# Patient Record
Sex: FEMALE | Race: BLACK OR AFRICAN AMERICAN | Employment: UNEMPLOYED | URBAN - METROPOLITAN AREA
[De-identification: names, ages, dates, MRNs, and addresses within clinical notes are randomized per-mention and may not be internally consistent; named-entity substitution may affect disease eponyms.]

---

## 2020-08-31 ENCOUNTER — VIRTUAL VISIT (OUTPATIENT)
Dept: SURGERY | Age: 40
End: 2020-08-31

## 2020-08-31 VITALS — HEIGHT: 64 IN | WEIGHT: 293 LBS | BODY MASS INDEX: 50.02 KG/M2

## 2020-08-31 DIAGNOSIS — K21.9 GASTROESOPHAGEAL REFLUX DISEASE WITHOUT ESOPHAGITIS: Primary | ICD-10-CM

## 2020-08-31 DIAGNOSIS — E66.01 MORBID OBESITY (HCC): ICD-10-CM

## 2020-08-31 DIAGNOSIS — D64.9 ANEMIA, UNSPECIFIED TYPE: ICD-10-CM

## 2020-08-31 DIAGNOSIS — M19.90 ARTHRITIS: ICD-10-CM

## 2020-08-31 DIAGNOSIS — E66.01 MORBID OBESITY WITH BMI OF 60.0-69.9, ADULT (HCC): ICD-10-CM

## 2020-08-31 DIAGNOSIS — K21.9 GASTROESOPHAGEAL REFLUX DISEASE, ESOPHAGITIS PRESENCE NOT SPECIFIED: ICD-10-CM

## 2020-08-31 RX ORDER — OMEPRAZOLE 40 MG/1
CAPSULE, DELAYED RELEASE ORAL
COMMUNITY
Start: 2020-08-19

## 2020-08-31 RX ORDER — ARIPIPRAZOLE 5 MG/1
TABLET ORAL
COMMUNITY
Start: 2020-08-28

## 2020-08-31 RX ORDER — CHOLECALCIFEROL (VITAMIN D3) 50 MCG
CAPSULE ORAL
COMMUNITY
End: 2020-12-21 | Stop reason: ALTCHOICE

## 2020-08-31 RX ORDER — FLUOXETINE HYDROCHLORIDE 40 MG/1
CAPSULE ORAL
COMMUNITY
Start: 2020-08-15

## 2020-08-31 RX ORDER — TRIAMTERENE/HYDROCHLOROTHIAZID 37.5-25 MG
TABLET ORAL AS NEEDED
COMMUNITY
Start: 2020-08-25

## 2020-08-31 RX ORDER — PHENTERMINE HYDROCHLORIDE 37.5 MG/1
TABLET ORAL
COMMUNITY
Start: 2020-06-26 | End: 2020-12-21 | Stop reason: ALTCHOICE

## 2020-08-31 NOTE — PROGRESS NOTES
Revision Surgery Consultation    Subjective: The patient is a 42 y.o. obese female with a Body mass index is 64.03 kg/m². .  The patient had a Lap band procedure done approximatly 12 years ago in [place] by Dr. Ny Cárdenas.  her starting weight prior to surgery was 320. she ultimately lost approximately 140 lbs with a subsequent weight regain of all of her weight.  her last bariatric follow-up was many years ago with Dr Ny Cárdenas after he removed her band due to chronic dysphagia. Vivien Luna notes that she had no issues in the immediate post-op phase and had no hospital readmissions in the remote post-op phase. she currently is having the following issues related to his health:weight regain. she is here today to discuss a possible conversion because of weight regain. All of their prior evaluations available by both their PCP's and specialists physicians have been reviewed today either in the Care Everywhere portal or scanned under the media tab. I have spent a large portion of my initial consultation today reviewing the patients current dietary habits which have contributed to their health issues, weight regain and  their current obesity. They understand that generally speaking,  weight regain is  a function of resuming less that ideal dietary habits instead of being a procedural issue. They understand that older procedures are more likely to be associated with a less that perfect procedural result, such as a prior vertical banded gastroplasty or non divided gastric bypass. These procedures are more likely to result in staple line failures with resultant weight regain. This has been explained to the patient via diagrams of these older procedures and given to the patient. I have suggested to them personally a dietary regimen that they can initiate now to help with their status as it pertains to their weight.   They understand that the most important aspect of their journey through their weight loss endeavor will be their adherence to a new lifestyle of healthy eating behavior. They also understand that an adherence to an exercise program will not only help with weight loss but is ultimately important in weight maintenance. Patient Active Problem List    Diagnosis Date Noted    Morbid obesity ([facility] [address]     Morbid obesity with BMI of 60.0-69.9, adult ([facility] [address]     Acid reflux       Past Surgical History:   Procedure Laterality Date    HX CHOLECYSTECTOMY      HX GYN      lap band placed and removed 2008 and 2012    HX GYN      D & C    HX ORTHOPAEDIC      right knee surgery    HX OTHER SURGICAL        Social History     Tobacco Use    Smoking status: Never Smoker    Smokeless tobacco: Never Used   Substance Use Topics    Alcohol use: Yes      History reviewed. No pertinent family history. Current Outpatient Medications   Medication Sig Dispense Refill    ARIPiprazole (ABILIFY) 5 mg tablet TK 1 T PO QD      FLUoxetine (PROzac) 40 mg capsule TK 2 CS PO Q DAY      TURMERIC PO Take  by mouth.  omega 3-dha-epa-fish oil (Fish Oil) 100-160-1,000 mg cap Take  by mouth.  triamterene-hydroCHLOROthiazide (MAXZIDE) 37.5-25 mg per tablet TK 1 T PO QD      omeprazole (PRILOSEC) 40 mg capsule TK ONE C PO QD BEFORE A MEAL      ergocalciferol (VITAMIN D2) 50,000 unit capsule Take 50,000 Units by mouth.  ferrous sulfate (IRON) 325 mg (65 mg iron) tablet Take 325 mg by mouth Daily (before breakfast).  CIMETIDINE (TAGAMET PO) Take 200 mg by mouth.  famotidine (PEPCID) 20 mg tablet Take 20 mg by mouth two (2) times a day.  phentermine (ADIPEX-P) 37.5 mg tablet TAKE 1 TABLET BY MOUTH ONCE DAILY BEFORE BREAKFAST      ondansetron (ZOFRAN ODT) 4 mg disintegrating tablet Take 1 Tab by mouth every eight (8) hours as needed for Nausea.  12 Tab 0     No Known Allergies       Review of Systems:            General - No history or complaints of unexpected fever, chills, or weight loss  Head/Neck - No history or complaints of headache, diplopia, dysphagia, hearing loss  Cardiac - No history or complaints of chest pain, palpitations, murmur, or shortness of breath  Pulmonary - No history or complaints of shortness of breath, productive cough, hemoptysis  Gastrointestinal - No history or complaints of reflux,  abdominal pain, obstipation/constipation, blood per rectum  Genitourinary - No history or complaints of hematuria/dysuria, stress urinary incontinence symptoms, or renal lithiasis  Musculoskeletal - No history or complaints of joint pain or muscular weakness  Hematologic - No history or complaints of bleeding disorders, blood transfusions, sickle cell anemia  Neurologic - No history or complaints of  migraine headaches, seizure activity, syncopal episodes, TIA or stroke  Integumentary - No history or complaints of rashes, abnormal nevi, skin cancer  Gynecological - normal mense           Objective:     Visit [facility] 5' 4\" (1.626 m)   Wt (!) 169.2 kg (373 lb)   BMI 64.03 kg/m²       Physical Examination:      Physical Examination: General appearance - alert, well appearing, and in no distress and oriented to person, place, and time  Mental status - alert, oriented to person, place, and time, normal mood, behavior, speech, dress, motor activity, and thought processes  Eyes - pupils equal and reactive, extraocular eye movements intact, sclera anicteric, left eye normal, right eye normal  Ears - right ear normal, left ear normal  Neck- good extension and flexion, no obvious swelling  Chest - good air movement  Heart - N/A  Abdomen - no obvious distension. Neurological - alert, oriented, normal speech, no focal findings or movement disorder noted  Musculoskeletal - no swelling noted  Extremities - normal movement    Labs / Old Records:              Old operative reports reviewed if available and are scanned under the media tab or reviewed under Care Bridget Barajas MD - 12/17/2012 3:43 PM EST  OPERATION    LOCATION OF PATIENT:  Room: [facility]    DATE OF OPERATION:  12/17/2012    SURGEON:  Ingrid Wang MD    PREOPERATIVE DIAGNOSIS:  1. Worsening reflux with regurgitation and dysphagia. 2. Status post placement of adjustable gastric band in the remote past.    POSTOPERATIVE DIAGNOSIS:  1. Worsening reflux with regurgitation and dysphagia. 2. Status post placement of adjustable gastric band in the remote past.    PROCEDURE:  Laparoscopy with removal of the adjustable gastric band (both the band and the  subcutaneous port). ANESTHETIC:  General.    MEDICATION:  Patient received 2 g Ancef IV piggyback preoperatively. ESTIMATED BLOOD LOSS:  Minimal.    TRANSFUSION:  None. SPECIMENS:  None. DISPOSITION:  The patient was returned to the recovery room in stable condition. INDICATIONS:  This is a 42-year-old black female approximately 5 years status post placement  of a laparoscopic adjustable gastric banding. She has recently had some  increasing gastroesophageal reflux disease and regurgitation. She underwent  upper endoscopy over the summer which revealed no obvious upper abnormality  outside of some reflux, although she did have peptic ulcer disease in the  duodenum. She has had all the fluid removed from her band but she continues to  has reflux-type symptoms with dysphagia and she is now only tolerating small  amounts of liquids. Any solids cause her discomfort and regurgitation. She is  taken to the operating suite at this time for removal of the adjustable gastric  band. Of note, she has lost approximately 100 pounds since her original weight  loss surgery. DESCRIPTION OF OPERATION:  The patient was taken to the operating suite and placed in the supine position  and given general anesthetic. Her entire abdomen was then sterilely prepped  and draped.  Each of the incision sites was infiltrated with 0.5% Marcaine with  epinephrine. The port site was palpated and a previous skin incision was used. This was opened using sharp dissection and subsequently electrocautery  dissection. The port was loosened from the anterior abdominal wall and the  port was lifted up into the surgical field. The adhesions were taken down. The port was removed by cutting the tubing. Attempted to first use an Optiview  scope initially without the Veress needle, but there was a small amount of scar  tissue at the previous incision site. A Veress needle was used. We were able  to obtain pneumoperitoneum quite easily then. The Optiview scope was then used  to place the trocar under direct visualization. The remaining trocars were  placed under direct visualization as well, a 5 mm trocar in the right lateral  abdomen, a 15 mm trocar through the previous port incision site and a 5 mm  trocar in the left lateral abdomen. An Jenny retractor was used to retract the  liver up to expose the previous surgical area near the GE junction. There was  significant scarring towards the undersurface of the liver and around the band  itself. We were able to follow the tubing more proximally to the locking  mechanism. The locking mechanism was incised allowing us to unlock the band  itself. The thicker end of the locking mechanism was  sharply with  scissors dissection from the remainder of the band. This allowed the band to  easily pull through the scarred-in tunnel. The band was then completely  removed from the abdomen. On visualizing the area again, the area appeared  quite normal with scarring surrounding this area which was quite thick. This  was left in situ. This should slowly soften with time. There was no evidence  of any erosion. The band itself was quite clean. The fascial defect at the 15  mm trocar site was then closed with a single suture of 0 PDS. The remaining 5  mm trocars were removed under direct visualization. Hemostasis of the  abdominal wall trocar sites was noted to be quite good. The subcutaneous  tissue was irrigated with normal saline. Pneumoperitoneum was completely  released and the skin was closed with interrupted subcuticular sutures of 4-0  Monocryl. Sterile dressings were applied over the incision sites. The patient  was awakened and taken to the recovery room in stable condition. Sponge,  needle, and instrument counts were correct x2 at the end of the procedure.    ______________________________  SIGNED: Jm Epps MD    [facility] VN: [id number]  D: 12/17/2012 12:12 T: 12/17/2012 15:30 DOCUMENT: [id number]      Assessment:     Morbid obesity status post Lap band with subsequent removal procedure approximately 12 and 8  years ago with complaint of weight regain. At this juncture I have spent over 20 minutes discussing with the patient the current national and worldwide recommendations regarding LAP-BAND patients. She understands that the general recommended conversion procedure would be the gastric bypass and not the sleeve gastrectomy. The patient is very hesitant to pursue the gastric bypass procedure as she is very fearful of the operation. It sounds to me that likely the only procedure she would choose as a conversion procedure would be the sleeve gastrectomy. The patient understands that generally speaking this is considered an inferior surgery to the gastric bypass procedure. She will come in for an upper GI study to assess her prior surgical procedures and she will start seeing our nutritionist for her medical weight loss plan. Plan: 1. Weight regain-Today in our office I had a lengthy discussion with Mimi Deluna regarding the nature of their prior procedure. We discussed the anatomical changes to their anatomy and how this relates to  contributing weight regain.  Our office will continue to attempt to obtain any medical records, if not obtained or available already ,related to their procedure. It was also discussed today that before any decisions can be made regarding a possible revision of their initial  procedure that an upper GI swallow study must be obtained to evaluate their post surgical anatomy. We will proceed with the UGI swallow study as described above. The patient understands all of the above and wishes to proceed with the study. 2.Nutrition-  I have discussed in detail the pitfalls in diet that have contributed to their weight regain and the importance of adhering to a lifelong regimen of dietary goals and proper eating habits. I have discussed the proper lifelong bariatric diet  in detail spending in excess of 20 minutes discussing this. We will schedule them for a dietary consultation with our nutritionist and urge them to continue on a regular follow-up schedule with her. 3.Maintenance vitamins- Today we have discussed the importance of vitamins as it pertains to their procedure and we will obtain appropriate lab to check all levels. They have been provided a handout regarding this today. Total time spent with the patient reviewing their complex history of bariatric surgery,diet, and plan is in excess of 60 minutes. Secondary Diagnoses:         Signed By: Kimo Wright MD     August 31, 2020         This visit with Renetta Bueno was performed under virtual telemedicine guidelines during the coronavirus (KKHAJ-23) public health emergency on 8/31/2020 in an interactive   fashion using [employer] me. They understand that this telemedicine encounter is a billable service, with coverage determined by their insurance carrier. They are aware that   they may receive a bill and have provided verbal consent for this virtual visit. This visit was performed with the patient in their home environment and provider was   present at [facility].  I have spent over 60 minutes on this visit  both prior to the visits reviewing the patients chart and with the patient face to face. I have reviewed their medical history, performed a telemedicine physical examination, and discussed the plan of action to date. They understand that they will be asked   to come to the office when our office is allowed normal patient interaction, as dictated by public health officials, for a face-to-face visit to rediscuss all of the things we  have talked about today. During this visit we discussed the varieties of surgeries that we perform, how they would impact the patient from a weight loss standpoint   considering their medical issues and prior surgeries, and also the restrictions that the patient would have long-term with the operation that they have chosen.     Alexandre Syed M.D.  8/31/2020

## 2020-08-31 NOTE — PATIENT INSTRUCTIONS
Patient Instructions 1. Continue to monitor carbohydrate and protein intake- remember to keep your           total  carbohydrates to 50 grams or less per day for best results. 2. Remember hydration goals - usually 48 to 64 ounces of liquids per day 3. Continue to work towards exercise goals - minimum 3 days per week of 45          minutes to  1 hour at a time. 4. Remember to take vitamins as directed Supplement Resource Guide Importance of Protein:  
Maintains lean body mass, produces antibodies to fight off infections, heals wounds, minimizes hair loss, helps to give you energy, helps with satiety, and keeping you full between meals. Importance of Calcium: 
Needed for healthy bones and teeth, normal blood clotting, and nervous system functioning, higher risk of osteoporosis and bone disease with non-compliance. Importance of Multivitamins: Many functions. Supply you with extra nutrients that you may be missing from food. May lead to iron deficiency anemia, weakness, fatigue, and many other symptoms with non-compliance. Importance of B Vitamins: 
Important for red blood cell formation, metabolism, energy, and helps to maintain a healthy nervous system. Protein Supplement Find one you like now. Use immediately after surgery. Look for: 
35-50g protein each day from your protein supplement once you reach the progression diet. 0-3 g fat per serving 0-3 g sugar per serving Protein drinks should be split in separate dosages. Recommend: Lifelong 1 year + Calcium Supplement:  
 
Start taking within a month after surgery. Look for: Calcium Citrate Plus D (1500 mg per day) Recommend: Citracal 
 
 . Avoid chocolate chewable calcium. Can use chewable bariatric or [employer] brand or similar chewable. The body cannot absorb more than 500-600 mg @ a time. Take for Life Multi-vitamin Supplement: 1st Month After Surgery: Any complete chewable, such as: [place]s Complete chewables. Avoid [place] sours or gummies. They lack iron and other important nutrients and also have added sugar. Continue with chewable vitamin or change to adult complete multivitamin one month after surgery. Menstruating women can take a prenatal vitamin. Make sure has at least 18 mg iron and 063-330 mcg folic acid): Vitamin B12, B Complex Vitamin, and Biotin Start taking within a month after surgery. Vitamin B12:  1000 mcg of Vitamin B12 three times weekly Must take sublingually (meaning you take it under your tongue) or in a liquid drop form for easy absorption. B Complex Vitamin: Take a pill or liquid drop form once daily. Biotin: This vitamin can help prevent hair loss. Recommend 5mg  
(5000 mcg) a day Biotin is Optional

## 2020-10-07 ENCOUNTER — CLINICAL SUPPORT (OUTPATIENT)
Dept: SURGERY | Age: 40
End: 2020-10-07

## 2020-10-07 VITALS — BODY MASS INDEX: 50.02 KG/M2 | HEIGHT: 64 IN | WEIGHT: 293 LBS

## 2020-10-07 DIAGNOSIS — E66.01 MORBID OBESITY WITH BMI OF 60.0-69.9, ADULT (HCC): Primary | ICD-10-CM

## 2020-10-07 NOTE — PROGRESS NOTES
Medical Weight Loss Multi-Disciplinary Program    Name: Natasha Rhodes   : 1980    Session# 1  Date: 10/7/2020     Height: 5' 4\" (162.6 cm)    Weight: (!) 169.2 kg (373 lb) lbs. Body mass index is 64.03 kg/m². Behavior Modification    Positive attitude  Comments: Pt is working on the following goals: Today the majority of our visit was spent reviewing patient's food recall and identifying areas for improvement. Educated  on the importance of eating 3 meals/day at regularly scheduled times including breakfast within 1st 1-2 hours of waking. Suggested patient use a protein supplement as a meal replacement instead of skipping OR as a between meal high protein snack. Also educated on the importance of including a protein with every meal and snack and reviewed lean sources. Lastly introduced  the Plate Method for Meal Planning and used food models to assist with visualizing recommended serving sizes. Dietitian: Lia Nicolas RD    Natasha Rhodes is a 42 y.o. female who present for a pre-op evaluation. Visit Vitals  Ht 5' 4\" (1.626 m)   Wt (!) 169.2 kg (373 lb)   BMI 64.03 kg/m²     Past Medical History:   Diagnosis Date    Acid reflux     Anemia     Arthritis     knees    GERD (gastroesophageal reflux disease)     Morbid obesity (HCC)     Morbid obesity with BMI of 60.0-69.9, adult (HCC)            Procedure:  laparoscopic sleeve gastrectomy     Reasons for Surgery:  BMI > 40 with one or more medically significant comorbidities    Summary:  Pt given brief pre/post-op diet ed and diet hx reviewed. Pt instructed to follow a low calorie, low carbohydrate, high protein diet of about 4668-5910 calories daily. Pt set several goals. See below. What have you done in the past to try to lose weight?  Pt has tried physician supervised diet ([facility]), has tried diet programs on her own such as portion control & exercise ([employer], [employer], etc.), online weight watchers. Pt lost 30 pounds through the weight loss clinic. Why didn't you lose weight or keep the weight off?: Pt saw the most success at the weight loss clinic, but had to stop due to financial constraints, and then gained the weight back after having stopped the program. Pt states the life \"curveballs\" have set her back and derailed consistency with behavior changes. Why do you think having weight loss surgery will make it possible for you to lose weight and keep it off? Pt believes that because weight loss through bariatric surgery is \"medically induced\" will keep her motivated to make behavior changes over the long-term. Pt also states that the negative side effects of her obesity cause pain and discomfort which prevent her from meal prep and exercise and after drastic weight loss from surgery, having less weight on her body will help her maintain behavior changes. Dietary Instructions    Nutritional Hx: What is the number of meals you eat per day? 1- 2  Comment: *Pt has gained 25 pounds during COVID outbreak    Do you eat between meals / snack? yes  Typical snack: chips, chocolate (snickers), cereal & milk, ice cream   Pt may eat cereal, chips, and ice cream for meals all throughout the day    How fast do you eat your meals? rapid    How often do you eat fast food, restaurant food, or take out? 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 1 (16 oz) soday/day [facility] [place]), 1   How many caffeinated drinks do you have per day? 1    How much milk and/or juice do you drink per day? 2% Milk with cereal throughout day + ice cream     How much water do you drink per day?  Llimited - 2 bottles water/day (~32 oz/day), sometimes Gatorade     How often do you consume alcohol? never;     Current Vitamins: 5,000 Vit D, Tumeric, Fish oil (500 mg/day),  Iron 325 mg/day    Diet History:    Typical intake is as follows:  Breakfast: cereal + milk  Snack: Snickers bar/chips  Lunch: Chips  Dinner: Ice cream  Snacks: chips, chocolate (snickers), cereal & milk, ice cream   Fluids: Diet Sunkist, soda,     Reviewed intake  Understanding label reading  Understanding low carbohydrates, low sugar, higher protein meals  Understanding proper portions  Dining outside home  Instruction given for personal dietary changes  Discussed perceived compliance  Comments: Pt given brief pre/post-op diet ed and diet hx reviewed. Patient Education and Materials Provided:  Supplement [facility], B Vitamin Information, MVI Recommendations, Calcium Citrate Information, Bariatric Supplement Companies, Protein Supplement Information, Fluid Requirements, No Caffeine or Carbonation, No Alcohol for One Year Post Op, 3 Balanced Meals a Day, Food Group Guide, Good Choices Dining Out, No Snacks, No Concentrated Sweets, Support System at Home, Exercising, Support Group Information and Addressed Current Habits / Changes to make  Physical Activity/Exercise    Discussed Perceived Compliance  Reasonable Goals Set  Motivation  Comments: none    Exercise:  Do you currently have an exercise routine? no    Goals:   1. Work to increase to 3-4 small meals per day, with planned snacks as needed. Recommend following plate method for meal planning - focusing on lean protein, non-starchy vegetables, and measured amounts of starch. - Goal of  g protein and  g carbohydrate per day. - Recommend continuing protein supplement as meal replacement at least 1x/day  2. Increase non caloric fluid to 64 oz per day. Eliminate caffeine, added sugar, carbonation, and straws.               -Continue to work to decrease sugar sweetened beverages - goal of calorie free beverages only              -Must eliminate caffeine prior to surgery and avoid for ~6-8 weeks  3.  Start activity regimen, work to increase ADL              -Try to start walking for at least 30-60 minutes 3-7 days per week  4. Start Complete MVI      Candidate for surgery (per RD):  PENDING  Steph Dumont RD  @[email or web address]

## 2020-11-02 ENCOUNTER — VIRTUAL VISIT (OUTPATIENT)
Dept: SURGERY | Age: 40
End: 2020-11-02
Payer: MEDICAID

## 2020-11-02 VITALS — WEIGHT: 293 LBS | HEIGHT: 64 IN | BODY MASS INDEX: 50.02 KG/M2

## 2020-11-02 DIAGNOSIS — E66.01 MORBID OBESITY WITH BMI OF 60.0-69.9, ADULT (HCC): ICD-10-CM

## 2020-11-02 DIAGNOSIS — M19.90 ARTHRITIS: ICD-10-CM

## 2020-11-02 DIAGNOSIS — D64.9 ANEMIA, UNSPECIFIED TYPE: ICD-10-CM

## 2020-11-02 DIAGNOSIS — K21.9 GASTROESOPHAGEAL REFLUX DISEASE, UNSPECIFIED WHETHER ESOPHAGITIS PRESENT: ICD-10-CM

## 2020-11-02 DIAGNOSIS — E66.01 MORBID OBESITY (HCC): Primary | ICD-10-CM

## 2020-11-02 PROCEDURE — 99214 OFFICE O/P EST MOD 30 MIN: CPT | Performed by: SPECIALIST

## 2020-11-02 NOTE — PROGRESS NOTES
Bariatric Surgery Preoperative Progress Note    Subjective:     Suki Alejo is a 42 y.o. obese female with a Body mass index is 64.03 kg/m². Amanda Bee she desires surgery at this time because of health issues and quality of life issues. Suki Alejo has been seen by a bariatric nutritionist and has been placed on an appropriate low carbohydrate diet. The patient desires laparoscopic sleeve gastrectomy for surgical weight loss, however she is here today to review their workup to date. Suki Alejo is here also today to check progress with weight loss / evaluate nutritional status and review all subspecialty clearances in hopes of proceeding to the operating room. I have discussed with the patient in detail now twice that the sleeve gastrectomy is an inferior operation as a conversion procedure. She however, still wishes to proceed only with that procedure. Patient Active Problem List    Diagnosis Date Noted    Morbid obesity ([facility] [address]     Morbid obesity with BMI of 60.0-69.9, adult ([facility] [address]     Acid reflux     Arthritis     Anemia     GERD (gastroesophageal reflux disease)       Past Surgical History:   Procedure Laterality Date    HX CHOLECYSTECTOMY      HX GYN      lap band placed and removed 2008 and 2012    HX GYN      D & C    HX ORTHOPAEDIC      right knee surgery    HX OTHER SURGICAL        Social History     Tobacco Use    Smoking status: Never Smoker    Smokeless tobacco: Never Used   Substance Use Topics    Alcohol use: Yes     Comment: social      History reviewed. No pertinent family history. Current Outpatient Medications   Medication Sig Dispense Refill    ARIPiprazole (ABILIFY) 5 mg tablet TK 1 T PO QD      FLUoxetine (PROzac) 40 mg capsule TK 2 CS PO Q DAY      TURMERIC PO Take  by mouth.  omega 3-dha-epa-fish oil (Fish Oil) 100-160-1,000 mg cap Take  by mouth.       triamterene-hydroCHLOROthiazide (MAXZIDE) 37.5-25 mg per tablet TK 1 T PO QD      phentermine (ADIPEX-P) 37.5 mg tablet TAKE 1 TABLET BY MOUTH ONCE DAILY BEFORE BREAKFAST      omeprazole (PRILOSEC) 40 mg capsule TK ONE C PO QD BEFORE A MEAL      ergocalciferol (VITAMIN D2) 50,000 unit capsule Take 50,000 Units by mouth.  ferrous sulfate (IRON) 325 mg (65 mg iron) tablet Take 325 mg by mouth Daily (before breakfast).  CIMETIDINE (TAGAMET PO) Take 200 mg by mouth.  famotidine (PEPCID) 20 mg tablet Take 20 mg by mouth two (2) times a day.  ondansetron (ZOFRAN ODT) 4 mg disintegrating tablet Take 1 Tab by mouth every eight (8) hours as needed for Nausea.  12 Tab 0     No Known Allergies       Review of Systems:            General - No history or complaints of unexpected fever, chills, or weight loss  Head/Neck - No history or complaints of headache, diplopia, dysphagia, hearing loss  Cardiac - No history or complaints of chest pain, palpitations, murmur, or shortness of breath  Pulmonary - No history or complaints of shortness of breath, productive cough, hemoptysis  Gastrointestinal - No history or complaints of reflux,  abdominal pain, obstipation/constipation, blood per rectum  Genitourinary - No history or complaints of hematuria/dysuria, stress urinary incontinence symptoms, or renal lithiasis  Musculoskeletal - No history or complaints of joint pain or muscular weakness  Hematologic - No history or complaints of bleeding disorders, blood transfusions, sickle cell anemia  Neurologic - No history or complaints of  migraine headaches, seizure activity, syncopal episodes, TIA or stroke  Integumentary - No history or complaints of rashes, abnormal nevi, skin cancer  Gynecological - no change           Objective:     Visit Vitals  Ht 5' 4\" (1.626 m)   Wt (!) 169.2 kg (373 lb)   BMI 64.03 kg/m²       Physical Examination:      Physical Examination: General appearance - alert, well appearing, and in no distress and oriented to person, place, and time  Mental status - alert, oriented to person, place, and time, normal mood, behavior, speech, dress, motor activity, and thought processes  Eyes - pupils equal and reactive, extraocular eye movements intact, sclera anicteric, left eye normal, right eye normal  Ears - right ear normal, left ear normal  Neck- good extension and flexion, no obvious swelling  Chest - good air movement  Heart - N/A  Abdomen - no obvious distension, scars as noted. Neurological - alert, oriented, normal speech, no focal findings or movement disorder noted  Musculoskeletal - no swelling noted  Extremities - normal movement    Labs:     No results found for this or any previous visit (from the past 2016 hour(s)). Assessment:     Morbid obesity with associated comorbidity     Plan:     Continuation of Pre-Operative evaluation / clearance. Natasha Rhodes has returned to the office today to discuss her status as a surgical candidate.    her progress has been noted and reviewed. We will continue the pre-operative process and work towards goals as outlined. she has 10-20 more pounds to lose before proceeding to the OR. (? pounds lost since last visit)  she has 5 more nutritional visits to complete before proceeding to the OR  she has an outstanding psychologic clearance to review before proceeding to the OR. Natasha Rhodes understand the rationales for all the above. It has been discussed that given her   condition   that the best surgical option for this patient would be the laparoscopic sleeve gastrectomy. Natasha Rhodes   agrees with the surgical choice and has been educated in it's; risks, benefits, and alternatives. We will continue   with the pre-operative evaluation as needed to check progress. Once again I have discussed with the patient the fact that the sleeve gastrectomy is an inferior conversion procedure. She understands that the gastric bypass would be better and likely lead to a more significant weight loss.   Despite my counseling of her she still wishes to only proceed with the sleeve gastrectomy understanding the potential weight loss pitfall regarding this operation. She understands that she needs to perform well in the preoperative phase and I have given her the task of a 10 to 20 pound weight loss before surgery. Secondary Diagnoses:         Signed By: Emma Adamson MD     November 2, 2020         This visit with Ulices Valdivia was performed under virtual telemedicine guidelines during the coronavirus (QRFJN-74) public health emergency on 11/2/2020 in an interactive   fashion using [email or web address]. They understand that this telemedicine encounter is a billable service, with coverage determined by their insurance carrier. They are aware that   they may receive a bill and have provided verbal consent for this virtual visit. This visit was performed with the patient in their home environment and provider was   present at [facility]. I have spent over 40 minutes on this visit  both prior to the visits reviewing the patients chart and with the patient face to face. I have reviewed their medical history, performed a telemedicine physical examination, and discussed the plan of action to date. They understand that they will be asked   to come to the office when our office is allowed normal patient interaction, as dictated by public health officials, for a face-to-face visit to rediscuss all of the things we  have talked about today. During this visit we discussed the varieties of surgeries that we perform, how they would impact the patient from a weight loss standpoint   considering their medical issues and prior surgeries, and also the restrictions that the patient would have long-term with the operation that they have chosen.     Chio Lawler M.D.  11/2/2020

## 2020-11-04 ENCOUNTER — CLINICAL SUPPORT (OUTPATIENT)
Dept: SURGERY | Age: 40
End: 2020-11-04

## 2020-11-04 VITALS — BODY MASS INDEX: 50.02 KG/M2 | WEIGHT: 293 LBS | HEIGHT: 64 IN

## 2020-11-04 DIAGNOSIS — E66.01 MORBID OBESITY WITH BMI OF 60.0-69.9, ADULT (HCC): Primary | ICD-10-CM

## 2020-11-04 NOTE — PROGRESS NOTES
Medical Weight Loss Multi-Disciplinary Program    Name: Michael Perez   : 1980    Session# 2  Date: 2020    Visit Vitals  Ht 5' 4\" (1.626 m)   Wt (!) 169.2 kg (373 lb)   BMI 64.03 kg/m²     10-20 pound weight loss goal - consult weight 373 lbs. Dietary Instructions    Reviewed intake  Understanding low carbohydrates, low sugar, higher protein meals  Instruction given for personal dietary changes  Discussed perceived compliance  Comments: Diet hx reviewed and personal dietary changes discussed. Reviewed recommendation to follow [id number] calorie diet, working to reduce total carbohydrate intake to  g or less per day and increasing protein intake to  g per day, compared current intake to recommendations. Today the majority of our visit was spent reviewing patient's food recall and identifying areas for improvement. Educated  on the importance of eating 3 meals/day at regularly scheduled times including breakfast within 1st 1-2 hours of waking. Suggested patient use a protein supplement as a meal replacement instead of skipping OR as a between meal high protein snack. Also educated on the importance of including a protein with every meal and snack and reviewed lean sources. Lastly introduced  the Plate Method for Meal Planning and used food models to assist with visualizing recommended serving sizes. Pt continues to skip meals. Has made some changes to food options (trying to reduce carbs and increase lean protein with vegetables. Pt has reduced pasta and grain intake. Pt continues snickers/candy snack or an ice-cream dessert but has reduced the amount of sweets that she used to eat. Steps 500 or less steps/day. Pt states she has no energy and states protein shakes are \"too expensive. \"     Nutritional Hx: What is the number of meals you eat per day? 1- 2 - continues   Comment: *Pt has gained 25 pounds during COVID outbreak    Do you eat between meals / snack?  yes  Typical snack: chips, chocolate (snickers), cereal & milk, ice cream   Pt may eat cereal, chips, and ice cream for meals all throughout the day    How fast do you eat your meals? rapid    How often do you eat fast food, restaurant food, or take out? 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 1 (16 oz) soday/day [facility]), 1   How many caffeinated drinks do you have per day? 1    How much milk and/or juice do you drink per day? 2% Milk with cereal throughout day + ice cream     How much water do you drink per day? Llimited - 2 bottles water/day (~32 oz/day), sometimes Gatorade     How often do you consume alcohol? never;     Current Vitamins: 5,000 Vit D, Tumeric, Fish oil (500 mg/day),  Iron 325 mg/day    Diet History:  *Taking phentermine     Typical intake is as follows:  Breakfast: cereal + milk OR 2 scrambled eggs w/ cheese + 2 pieces toast  Lunch: SKIPS  Dinner: Grilled Ayan Chicken + lettuce  Snacks: chips, chocolate (snickers), cereal & milk, ice cream Snickers bar/chips - continues  Fluids: Diet Sunkist, soda  Physical Activity/Exercise    Discussed Perceived Compliance  Motivation    Patient has not started physical activity regimen, reinforced the importance of regular physical activity for total health and weight management. Suggested starting walking or chair exercises for at least 3-7 days per week for 30-60 minutes, patient was receptive to recommendation. Behavior Modification    Identify obstacles to trigger change  Achieving/Rewarding goals met  Positive attitude  Comments: Reinforced importance continuing to modify lifestyle patterns and behaviors to promote weight loss and long term weight maintenance       Goals:   1. Work to increase to 3-4 small meals per day, with planned snacks as needed.   Recommend following plate method for meal planning - focusing on lean protein, non-starchy vegetables, and measured amounts of starch. - Goal of  g protein and  g carbohydrate per day. - Recommend continuing protein supplement as meal replacement at least 1x/day OR as high protein snack option  2. Increase non caloric fluid to 64 oz per day. Eliminate caffeine, added sugar, carbonation, and straws.               -Continue to work to decrease sugar sweetened beverages - goal of calorie free beverages only              -Must eliminate caffeine prior to surgery and avoid for ~6-8 weeks   -Practice 30:30 rule,  food and flood   3. Start activity regimen, work to increase ADL  4. Start Complete MVI    Candidate for surgery (per RD):  PENDING    Dietitian: Adrian Miranda, RD

## 2020-12-04 ENCOUNTER — CLINICAL SUPPORT (OUTPATIENT)
Dept: SURGERY | Age: 40
End: 2020-12-04

## 2020-12-04 VITALS — WEIGHT: 293 LBS | BODY MASS INDEX: 50.02 KG/M2 | HEIGHT: 64 IN

## 2020-12-04 DIAGNOSIS — E66.01 MORBID OBESITY WITH BMI OF 60.0-69.9, ADULT (HCC): Primary | ICD-10-CM

## 2020-12-04 NOTE — PROGRESS NOTES
Medical Weight Loss Multi-Disciplinary Program    Name: Gerson Worthington   : 1980    Session# 3 - revision - OPTIMA - needs phys  Date: 2020    Visit Vitals  Ht 5' 4\" (1.626 m)   Wt (!) 173.3 kg (382 lb)   BMI 65.57 kg/m²       10-20 pound weight loss goal - consult weight 373 lbs (weight loss goal = 353-363 lbs). 2020: Pt has gained ~10 pounds since consult weight. Pt has stopped phentermine due to no weight loss (after 6 months on it). Pt states that she lost the hard-copy nutrition packet that was mailed to her. Dietary Instructions    Reviewed intake  Understanding low carbohydrates, low sugar, higher protein meals  Instruction given for personal dietary changes  Discussed perceived compliance  Comments: Diet hx reviewed and personal dietary changes discussed. Reviewed recommendation to follow [id number] calorie diet, working to reduce total carbohydrate intake to  g or less per day and increasing protein intake to  g per day, compared current intake to recommendations. Today the majority of our visit was spent reviewing patient's food recall and identifying areas for improvement. Educated  on the importance of eating 3 meals/day at regularly scheduled times including breakfast within 1st 1-2 hours of waking. Suggested patient use a protein supplement as a meal replacement instead of skipping OR as a between meal high protein snack. Also educated on the importance of including a protein with every meal and snack and reviewed lean sources. Lastly introduced  the Plate Method for Meal Planning and used food models to assist with visualizing recommended serving sizes. Pt continues to skip meals. Pt has made minimal changes this month and has not reviewed nutrition handouts. Pt continues to struggle with carbohydrate intake. Pt has started skipping meals again.  Pt continues snickers/candy snack or an ice-cream dessert but has reduced the amount of sweets that she used to eat. Steps 500 or less steps/day. Pt states she has no energy and states protein shakes are \"too expensive. \" Pt is not exercising, pt has intermittently increased her activities of daily living (trying to walk more while at the grocery store - goal of 1,500 steps/day). Pt agrees that she needs to eliminate all sweets/desserts/Junk food. Recommend pt cut out baked chips/junk food and use non-starchy vegetables as a replacement. Nutritional Hx: What is the number of meals you eat per day? 1- 2 - continues   Comment: *Pt has gained 25 pounds during COVID outbreak    Do you eat between meals / snack? yes  Typical snack: chips, chocolate (snickers), cereal & milk, ice cream - reduced   Pt may eat cereal, chips, and ice cream for meals all throughout the day    How fast do you eat your meals? rapid    How often do you eat fast food, restaurant food, or take out? 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 1 (16 oz) soday/day [facility]), 1   How many caffeinated drinks do you have per day? 1    How much milk and/or juice do you drink per day? 2% Milk with cereal throughout day + ice cream     How much water do you drink per day?  Llimited - 2 bottles water/day (~32 oz/day), sometimes Gatorade     How often do you consume alcohol? never;     Current Vitamins: 5,000 Vit D, Tumeric, Fish oil (500 mg/day),  Iron 325 mg/day    Diet History:  Typical intake is as follows:  Breakfast: cereal + milk OR 2 scrambled eggs w/ cheese + 2 pieces toast OR 2 slices frozen pizza  Lunch: SKIPS  Dinner: Grilled Ayan Chicken + lettuce   Snacks: chips, chocolate (snickers), cereal & milk, ice cream Snickers bar/chips - continues  Fluids: Diet Sunkist, soda  Physical Activity/Exercise    Discussed Perceived Compliance  Motivation    Patient has not started physical activity regimen, reinforced the importance of regular physical activity for total health and weight management. Suggested starting walking or chair exercises for at least 3-7 days per week for 30-60 minutes, patient was receptive to recommendation. Behavior Modification    Identify obstacles to trigger change  Achieving/Rewarding goals met  Positive attitude  Comments: Reinforced importance continuing to modify lifestyle patterns and behaviors to promote weight loss and long term weight maintenance       Goals:   1. Work to increase to 3-4 small meals per day, with planned snacks as needed. Recommend following plate method for meal planning - focusing on lean protein, non-starchy vegetables, and measured amounts of starch. - Goal of  g protein and  g carbohydrate per day. - Recommend continuing protein supplement as meal replacement at least 1x/day OR as high protein snack option  2. Increase non caloric fluid to 64 oz per day. Eliminate caffeine, added sugar, carbonation, and straws.               -Continue to work to decrease sugar sweetened beverages - goal of calorie free beverages only              -Must eliminate caffeine prior to surgery and avoid for ~6-8 weeks   -Practice 30:30 rule,  food and flood   3. Start activity regimen, work to increase ADL  4. Start Complete MVI    Candidate for surgery (per RD):  PENDING    Dietitian: Soila Jimenez RD

## 2020-12-21 ENCOUNTER — OFFICE VISIT (OUTPATIENT)
Dept: SURGERY | Age: 40
End: 2020-12-21
Payer: MEDICAID

## 2020-12-21 VITALS
OXYGEN SATURATION: 98 % | WEIGHT: 293 LBS | SYSTOLIC BLOOD PRESSURE: 162 MMHG | DIASTOLIC BLOOD PRESSURE: 80 MMHG | HEIGHT: 65 IN | BODY MASS INDEX: 48.82 KG/M2

## 2020-12-21 DIAGNOSIS — E66.01 MORBID OBESITY (HCC): Primary | ICD-10-CM

## 2020-12-21 DIAGNOSIS — R03.0 ELEVATED BP WITHOUT DIAGNOSIS OF HYPERTENSION: ICD-10-CM

## 2020-12-21 DIAGNOSIS — D64.9 ANEMIA, UNSPECIFIED TYPE: ICD-10-CM

## 2020-12-21 DIAGNOSIS — K21.9 GASTROESOPHAGEAL REFLUX DISEASE, UNSPECIFIED WHETHER ESOPHAGITIS PRESENT: ICD-10-CM

## 2020-12-21 DIAGNOSIS — E66.01 MORBID OBESITY WITH BMI OF 60.0-69.9, ADULT (HCC): ICD-10-CM

## 2020-12-21 DIAGNOSIS — M19.90 ARTHRITIS: ICD-10-CM

## 2020-12-21 PROCEDURE — 99214 OFFICE O/P EST MOD 30 MIN: CPT | Performed by: NURSE PRACTITIONER

## 2020-12-21 NOTE — PROGRESS NOTES
Bariatric Surgery Consultation    Subjective:     Barbara Arvizu is a 42 y.o. obese female with a Body mass index is 65.2 kg/m². Amanda Bee Barbara Arvizu desires surgery at this time because of health issues and quality of life issues. Barbara Arvizu has been seen by a bariatric nutritionist and has been placed on an appropriate low carbohydrate diet. The patient desires laparoscopic sleeve gastrectomy for surgical weight loss, however she is not currently a surgical candidate due to pending work up. Barbara Arvizu is here today to check progress with weight loss / evaluate nutritional status and review all subspecialty clearances in hopes of proceeding to the operating room. Office visit notes from August 2020 to present have been reviewed. Barbara Arvizu has increased fluid intake, is focusing on protein, is eating regularly, is taking a multivitamin & has increased her activity. No recent visits with PCP. No new medications. We have discussed with the patient in detail now twice that the sleeve gastrectomy is an inferior operation as a conversion procedure, especially with her history of reflux. She however, still wishes to proceed only with that procedure. Patient Active Problem List    Diagnosis Date Noted    Morbid obesity ([address]     Morbid obesity with BMI of 60.0-69.9, adult ([facility] [address]     Acid reflux     Arthritis     Anemia     GERD (gastroesophageal reflux disease)       Past Surgical History:   Procedure Laterality Date    HX CHOLECYSTECTOMY      HX GI      lap band placed and removed 2008 and 2012    HX GYN      D & C    HX ORTHOPAEDIC      right knee surgery    HX OTHER SURGICAL      HX TUBAL LIGATION        Social History     Tobacco Use    Smoking status: Never Smoker    Smokeless tobacco: Never Used   Substance Use Topics    Alcohol use: Yes     Comment: social      No family history on file.    Current Outpatient Medications   Medication Sig Dispense Refill    ARIPiprazole (ABILIFY) 5 mg tablet TK 1 T PO QD      FLUoxetine (PROzac) 40 mg capsule TK 2 CS PO Q DAY      triamterene-hydroCHLOROthiazide (MAXZIDE) 37.5-25 mg per tablet as needed.  omeprazole (PRILOSEC) 40 mg capsule TK ONE C PO QD BEFORE A MEAL      ergocalciferol (VITAMIN D2) 50,000 unit capsule Take 50,000 Units by mouth.  ferrous sulfate (IRON) 325 mg (65 mg iron) tablet Take 325 mg by mouth Daily (before breakfast). No Known Allergies       Review of Systems:        General - No history or complaints of unexpected fever, chills, or weight loss  Head/Neck - No history or complaints of headache, diplopia, dysphagia, hearing loss  Cardiac - No history or complaints of chest pain, palpitations, murmur, or shortness of breath  Pulmonary - No history or complaints of shortness of breath, productive cough, hemoptysis  Gastrointestinal - has reflux that is controlled with PPI,  abdominal pain, obstipation/constipation, blood per rectum  Genitourinary - No history or complaints of hematuria/dysuria, stress urinary incontinence symptoms, or renal lithiasis  Musculoskeletal - has joint pain in back and low back, no muscular weakness  Hematologic - No history or complaints of bleeding disorders, blood transfusions, sickle cell anemia  Neurologic - No history or complaints of  migraine headaches, seizure activity, syncopal episodes, TIA or stroke  Integumentary - No history or complaints of rashes, abnormal nevi, skin cancer  Gynecological - No menses in past 4 months    Objective:     Visit Vitals  BP (!) 162/80 (BP 1 Location: Left arm, BP Patient Position: Sitting)   Ht 5' 4.5\" (1.638 m)   Wt (!) 175 kg (385 lb 12.8 oz)   SpO2 98%   BMI 65.20 kg/m²       Physical Exam:    General:  alert, cooperative, no distress, appears stated age. Very overweight.    Lungs:   clear to auscultation bilaterally   Heart:  Regular rate and rhythm, S1S2 present or without murmur or extra heart sounds   Abdomen:   abdomen is soft without tenderness, masses, organomegaly or guarding; Active bowel sounds all 4 quadrants. Severe central obesity         Labs:     No results found for this or any previous visit (from the past 2016 hour(s)). Assessment:     Morbid obesity with associated comorbidity, severe central obesity & outstanding insurance requirements. Plan: To continue current medications & routine follow-up with PCP. Continuation of Pre-Operative evaluation / clearance:  Cassie Velazquez has returned to the office today to discuss her status as a surgical candidate. Progress has been noted and reviewed - including review of notes from dietician. Cassie Velazquez is being compliant with follow-up & recommendations. Cassie Velazquez has 22-32 more pounds to lose before proceeding to the OR. (12 pounds gained since last visit)  Cassie Velazquez has 3 more nutritional visits to complete before proceeding to the OR. Additionally, 1 more medical visits are required. Cassie Velazquez has an outstanding psychological and PCP clearance to review before proceeding to the OR. Cassie Velazquez will return in 1 month to continue the pre-operative process and to work towards goals as outlined. Cassie Velazquez understand the rationales for all the above and plans to follow the diet & activity recommendations of the dietician. It has been discussed that given her morbidly obese condition that the best surgical option for this patient would be the laparoscopic gastric bypass surgery, but she is choosing sleeve gastrectomy at this time. Cassie Velazquez agrees with the surgical choice and has been educated in it's; risks, benefits, and alternatives. We will continue with the pre-operative evaluation as needed to check progress.     Once again we have discussed with the patient the fact that the sleeve gastrectomy is an inferior conversion procedure. She understands that the gastric bypass would be better and likely lead to a more significant weight loss and better control of her reflux. Despite my counseling of her she still wishes to only proceed with the sleeve gastrectomy understanding the potential weight loss pitfall regarding this operation. She understands that she needs to perform well in the preoperative phase and I have given her the task of a 10 to 20 pound weight loss before surgery. Secondary Diagnoses:     Dietary Intervention  - The patient is currently followed by a bariatric nutritionist for an attempt at preoperative weight loss as has been dictated by their insurance carrier. They will be assessed at various times during their follow up to evaluate their progress depending on the length of time that is required once again by their carrier. I have explained the importance of preoperative weight loss and the benefits regarding lower surgical risk and also assisting the patient in reaching their weight loss goal.  Finally they understand there is a physiologic benefit from the standpoint of hepatic volume reduction preoperatively. I have reiterated the importance of a low carbohydrate and high protein regimen to achieve their stated goal.The patients weight loss goal pre operatively is 10-20 pounds. Ms. Josefina Barrow has a reminder for a \"due or due soon\" health maintenance. I have asked that she contact her primary care provider for follow-up on this health maintenance.       Signed By: Coco Albright NP     December 21, 2020

## 2020-12-21 NOTE — PATIENT INSTRUCTIONS
Supplement Resource Guide Importance of Protein:  
Maintains lean body mass, produces antibodies to fight off infections, heals wounds, minimizes hair loss, helps to give you energy, helps with satiety, and keeping you full between meals. Importance of Calcium: 
Needed for healthy bones and teeth, normal blood clotting, and nervous system functioning, higher risk of osteoporosis and bone disease with non-compliance. Importance of Multivitamins: Many functions. Supply you with extra nutrients that you may be missing from food. May lead to iron deficiency anemia, weakness, fatigue, and many other symptoms with non-compliance. Importance of B Vitamins: 
Important for red blood cell formation, metabolism, energy, and helps to maintain a healthy nervous system. Protein Supplement Find one you like now. Use immediately after surgery. Look for: 
35-50g protein each day from your protein supplement once you reach the progression diet. 0-3 g fat per serving 0-3 g sugar per serving Protein drinks should be split in separate dosages. Recommend: Lifelong 1 year + Calcium Supplement:  
 
Start taking within a month after surgery. Look for: Calcium Citrate Plus D (1500 mg per day) Recommend: Citracal 
 
 . Avoid chocolate chewable calcium. Can use chewable bariatric or [employer] brand or similar chewable. The body cannot absorb more than 500-600 mg of calcium at a time. Take for Life Multi-vitamin Supplement:   
 
Start immediately after surgery: any complete chewable, such as: [place]s Complete chewables. Avoid [place] sours or gummies. They lack iron and other important nutrients and also have added sugar. Continue with chewable vitamin or change to adult complete multivitamin one month after surgery. Menstruating women can take a prenatal vitamin. Make sure has at least 18 mg iron and [phone] mcg folic acid Vitamin B12, B Complex Vitamin, and Biotin Start taking within a month after surgery. Vitamin B12:  1000 mcg of Vitamin B12 three times weekly Must take sublingually (meaning you take it under your tongue) or in a liquid drop form for easy absorption. B Complex Vitamin: Take a pill or liquid drop form once daily. Biotin: This vitamin can help prevent hair loss. Recommend 5mg  
(5000 mcg) a day Biotin is Optional  
 
 
 
 
  
Learning About Physical Activity What is physical activity? Physical activity is any kind of activity that gets your body moving. The types of physical activity that can help you get fit and stay healthy include: · Aerobic or \"cardio\" activities that make your heart beat faster and make you breathe harder, such as brisk walking, riding a bike, or running. Aerobic activities strengthen your heart and lungs and build up your endurance. · Strength training activities that make your muscles work against, or \"resist,\" something, such as lifting weights or doing push-ups. These activities help tone and strengthen your muscles. · Stretches that allow you to move your joints and muscles through their full range of motion. Stretching helps you be more flexible and avoid injury. What are the benefits of physical activity? Being active is one of the best things you can do for your health. It helps you to: · Feel stronger and have more energy to do all the things you like to do. · Focus better at school or work. · Feel, think, and sleep better. · Reach and stay at a healthy weight. · Lose fat and build lean muscle. · Lower your risk for serious health problems. · Keep your bones, muscles, and joints strong. How can you make physical activity part of your life? Get at least 30 minutes of exercise on most days of the week. Walking is a good choice. You also may want to do other activities, such as running, swimming, cycling, or playing tennis or team sports. Pick activities that you likeones that make your heart beat faster, your muscles stronger, and your muscles and joints more flexible. If you find more than one thing you like doing, do them all. You don't have to do the same thing every day. Get your heart pumping every day. Any activity that makes your heart beat faster and keeps it at that rate for a while counts. Here are some great ways to get your heart beating faster: · Go for a brisk walk, run, or bike ride. · Go for a hike or swim. · Go in-line skating. · Play a game of touch football, basketball, or soccer. · Ride a bike. · Play tennis or racquetball. · Climb stairs. Even some household chores can be aerobicjust do them at a faster pace. Vacuuming, raking or mowing the lawn, sweeping the garage, and washing and waxing the car all can help get your heart rate up. Strengthen your muscles during the week. You don't have to lift heavy weights or grow big, bulky muscles to get stronger. Doing a few simple activities that make your muscles work against, or \"resist,\" something can help you get stronger. For example, you can: · Do push-ups or sit-ups, which use your own body weight as resistance. · Lift weights or dumbbells or use stretch bands at home or in a gym or community center. Stretch your muscles often. Stretching will help you as you become more active. It can help you stay flexible, loosen tight muscles, and avoid injury. It can also help improve your balance and posture and can be a great way to relax. Be sure to stretch the muscles you'll be using when you work out. It's best to warm your muscles slightly before you stretch them. Walk or do some other light aerobic activity for a few minutes, and then start stretching. When you stretch your muscles: · Do it slowly. Stretching is not about going fast or making sudden movements. · Don't push or bounce during a stretch. · Hold each stretch for at least 15 to 30 seconds, if you can. You should feel a stretch in the muscle, but not pain. · Breathe out as you do the stretch. Then breathe in as you hold the stretch. Don't hold your breath. If you're worried about how more activity might affect your health, have a checkup before you start. Follow any special advice your doctor gives you for getting a smart start. Where can you learn more? Go to http://www.gray.com/ Enter S080 in the search box to learn more about \"Learning About Physical Activity. \" Current as of: January 16, 2020               Content Version: 12.6 © 8124-8216 [employer]. Care instructions adapted under license by [employer] (which disclaims liability or warranty for this information). If you have questions about a medical condition or this instruction, always ask your healthcare professional. [address] any warranty or liability for your use of this information.

## 2021-01-08 ENCOUNTER — CLINICAL SUPPORT (OUTPATIENT)
Dept: SURGERY | Age: 41
End: 2021-01-08

## 2021-01-08 VITALS — BODY MASS INDEX: 48.82 KG/M2 | WEIGHT: 293 LBS | HEIGHT: 65 IN

## 2021-01-08 DIAGNOSIS — E66.01 MORBID OBESITY WITH BMI OF 60.0-69.9, ADULT (HCC): Primary | ICD-10-CM

## 2021-01-08 NOTE — PROGRESS NOTES
Medical Weight Loss Multi-Disciplinary Program    Name: Kailyn Persaud   : 1980    Session# 4 - revision - OPTIMA - needs 1 more med visit  Date: 2021    Visit Vitals  Ht 5' 4.5\" (1.638 m)   Wt (!) 171 kg (377 lb)   BMI 63.71 kg/m²   *Self-reported weight. Last office visit : 385 lbs    10-20 pound weight loss goal - consult weight 373 lbs (weight loss goal = 353-363 lbs). 2020: Pt has gained ~10 pounds since consult weight. Pt has stopped phentermine due to no weight loss (after 6 months on it). Pt states that she lost the hard-copy nutrition packet that was mailed to her. Dietary Instructions    Reviewed intake  Understanding low carbohydrates, low sugar, higher protein meals  Instruction given for personal dietary changes  Discussed perceived compliance  Comments: Diet hx reviewed and personal dietary changes discussed. Reviewed recommendation to follow [id number] calorie diet, working to reduce total carbohydrate intake to  g or less per day and increasing protein intake to  g per day, compared current intake to recommendations. Today the majority of our visit was spent reviewing patient's food recall and identifying areas for improvement. Educated  on the importance of eating 3 meals/day at regularly scheduled times including breakfast within 1st 1-2 hours of waking. Suggested patient use a protein supplement as a meal replacement instead of skipping OR as a between meal high protein snack. Also educated on the importance of including a protein with every meal and snack and reviewed lean sources. Lastly introduced  the Plate Method for Meal Planning and used food models to assist with visualizing recommended serving sizes. 2021: pt has cut out junk-food snacking, she is snacking on pickles or boiled eggs or peanut butter and celery (instead of chips/snickers).  Some discrepancy between reported weight and office weight - pt states our office scale weighs her 10 pounds more than her home-scale. Pt open to consider bypass due to reflux risk with sleeve. Pt has started to review the nutrition handouts this month (improvement). Pt plans to avoid meal skipping this month and eliminate SSB (using water, crystal light, & Protein shakes). Nutritional Hx: What is the number of meals you eat per day? 2 -3 - continues but improvements  Comment: *Pt has gained 25 pounds during COVID outbreak    Do you eat between meals / snack? yes  Typical snack: chips, chocolate (snickers), cereal & milk, ice cream - eliminated - switched to veggies, fruits, protein    How fast do you eat your meals? rapid    How often do you eat fast food, restaurant food, or take out? 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 1 (16 oz) soday/day [facility]), 1   How many caffeinated drinks do you have per day? 1    How much milk and/or juice do you drink per day? 2% Milk with cereal throughout day + ice cream     How much water do you drink per day? Increased - 3-5 bottles water/day (~32 oz/day), sometimes Gatorade     How often do you consume alcohol? never;     Current Vitamins: 5,000 Vit D, Tumeric, Fish oil (500 mg/day),  Iron 325 mg/day    Diet History:  Typical intake is as follows:  Breakfast: cereal + milk OR 2 scrambled eggs w/ cheese + 2 pieces toast OR 2 slices frozen pizza OR 3 eggs + sharp cheddar cheese  Lunch: SKIPS OR tuna fish packs  Dinner: Grilled [employer] Chicken + lettuce + sweet tea  Snacks: cut out chips/snickers - applesauce & watermelon (knows this is a carb), boiled egg, PB and celery   Fluids: Diet Sunkist, soda  Physical Activity/Exercise    Discussed Perceived Compliance  Motivation    Patient has not started physical activity regimen, reinforced the importance of regular physical activity for total health and weight management.   Suggested starting walking or chair exercises for at least 3-7 days per week for 30-60 minutes, patient was receptive to recommendation. Behavior Modification    Identify obstacles to trigger change  Achieving/Rewarding goals met  Positive attitude  Comments: Reinforced importance continuing to modify lifestyle patterns and behaviors to promote weight loss and long term weight maintenance       Goals:   1. Work to increase to 3-4 small meals per day, with planned snacks as needed. Recommend following plate method for meal planning - focusing on lean protein, non-starchy vegetables, and measured amounts of starch. - Goal of  g protein and  g carbohydrate per day. - Recommend continuing protein supplement as meal replacement at least 1x/day OR as high protein snack option  2. Increase non caloric fluid to 64 oz per day. Eliminate caffeine, added sugar, carbonation, and straws.               -Continue to work to decrease sugar sweetened beverages - goal of calorie free beverages only              -Must eliminate caffeine prior to surgery and avoid for ~6-8 weeks   -Practice 30:30 rule,  food and flood   3. Start activity regimen, work to increase ADL  4. Start Complete MVI    Candidate for surgery (per RD):  PENDING    Dietitian: Rk Chaparor RD

## 2021-01-11 ENCOUNTER — OFFICE VISIT (OUTPATIENT)
Dept: SURGERY | Age: 41
End: 2021-01-11
Payer: MEDICAID

## 2021-01-11 VITALS
DIASTOLIC BLOOD PRESSURE: 93 MMHG | HEART RATE: 85 BPM | BODY MASS INDEX: 48.82 KG/M2 | WEIGHT: 293 LBS | SYSTOLIC BLOOD PRESSURE: 151 MMHG | HEIGHT: 65 IN | RESPIRATION RATE: 16 BRPM | OXYGEN SATURATION: 98 %

## 2021-01-11 DIAGNOSIS — E66.01 MORBID OBESITY (HCC): ICD-10-CM

## 2021-01-11 DIAGNOSIS — D64.9 ANEMIA, UNSPECIFIED TYPE: ICD-10-CM

## 2021-01-11 DIAGNOSIS — K21.9 GASTROESOPHAGEAL REFLUX DISEASE WITHOUT ESOPHAGITIS: Primary | ICD-10-CM

## 2021-01-11 DIAGNOSIS — K57.90 DIVERTICULOSIS: ICD-10-CM

## 2021-01-11 DIAGNOSIS — K21.9 GASTROESOPHAGEAL REFLUX DISEASE, UNSPECIFIED WHETHER ESOPHAGITIS PRESENT: ICD-10-CM

## 2021-01-11 DIAGNOSIS — E66.01 MORBID OBESITY WITH BMI OF 60.0-69.9, ADULT (HCC): ICD-10-CM

## 2021-01-11 DIAGNOSIS — R03.0 ELEVATED BP WITHOUT DIAGNOSIS OF HYPERTENSION: ICD-10-CM

## 2021-01-11 DIAGNOSIS — M19.90 ARTHRITIS: ICD-10-CM

## 2021-01-11 PROCEDURE — 99214 OFFICE O/P EST MOD 30 MIN: CPT | Performed by: SPECIALIST

## 2021-01-11 NOTE — PROGRESS NOTES
Pre-Operative Progress Consultation    Subjective:     Donis Schilder is a 47 y.o. obese female with a Body mass index is 64.32 kg/m². Efe Reas she desires surgery at this time because of health issues and quality of life issues. Donis Schilder has tried multiple diets in her lifetime most recently tried physician supervised, behavior modification and unsupervised diets. She had her clarence claribel placed gastric band removed by that surgeon approx 8 years ago. She has been seeking a sleeve resection via this office but as of today wishes to proceed with a gastric bypass with a severe GERD and a BMI of 64. Bariatric comorbidities present are   Patient Active Problem List   Diagnosis Code    Morbid obesity ([address] E66.01    Morbid obesity with BMI of 60.0-69.9, adult ([address] E66.01, Z68.44    Acid reflux K21.9    Arthritis M19.90    Anemia D64.9    GERD (gastroesophageal reflux disease) K21.9    Edema R60.9    Elevated BP without diagnosis of hypertension R03.0    Malignant carcinoid tumor of the rectum ([address] C7A.026    Diverticulosis K57.90     The patient desires laparoscopic gastric bypass surgery for surgical weight loss. Donis Schilder is here today to check progress with weight loss / evaluate nutritional status and review all subspecialty clearances in hopes of proceeding to the operating room.      Patient Active Problem List    Diagnosis Date Noted    Malignant carcinoid tumor of the rectum ([address]     Diverticulosis     Edema     Elevated BP without diagnosis of hypertension     Morbid obesity ([address]     Morbid obesity with BMI of 60.0-69.9, adult ([address]     Acid reflux     Arthritis     Anemia     GERD (gastroesophageal reflux disease)       Past Surgical History:   Procedure Laterality Date    HX CHOLECYSTECTOMY      HX GI      lap band placed and removed 2008 and 2012    HX GYN      D & C    HX ORTHOPAEDIC      right knee surgery    HX TUBAL LIGATION        Social History     Tobacco Use    Smoking status: Never Smoker    Smokeless tobacco: Never Used   Substance Use Topics    Alcohol use: Not Currently     Comment: social      Family History   Problem Relation Age of Onset    Diabetes Father       Current Outpatient Medications   Medication Sig Dispense Refill    ARIPiprazole (ABILIFY) 5 mg tablet TK 1 T PO QD      FLUoxetine (PROzac) 40 mg capsule TK 2 CS PO Q DAY      triamterene-hydroCHLOROthiazide (MAXZIDE) 37.5-25 mg per tablet as needed.  omeprazole (PRILOSEC) 40 mg capsule TK ONE C PO QD BEFORE A MEAL      ergocalciferol (VITAMIN D2) 50,000 unit capsule Take 50,000 Units by mouth.  ferrous sulfate (IRON) 325 mg (65 mg iron) tablet Take 325 mg by mouth Daily (before breakfast).        No Known Allergies       Review of Systems:        General - No history or complaints of unexpected fever, chills, or weight loss  Head/Neck - No history or complaints of headache, diplopia, dysphagia, hearing loss  Cardiac - No history or complaints of chest pain, palpitations, murmur, or shortness of breath  Pulmonary - No history or complaints of shortness of breath, productive cough, hemoptysis  Gastrointestinal - No history or complaints of reflux,  abdominal pain, obstipation/constipation, blood per rectum  Genitourinary - No history or complaints of hematuria/dysuria, stress urinary incontinence symptoms, or renal lithiasis  Musculoskeletal - No history or complaints of joint pain or muscular weakness  Hematologic - No history or complaints of bleeding disorders, blood transfusions, sickle cell anemia  Neurologic - No history or complaints of  migraine headaches, seizure activity, syncopal episodes, TIA or stroke  Integumentary - No history or complaints of rashes, abnormal nevi, skin cancer  Gynecological - No history of heavy menses/abnormal menses    Objective:     Visit Vitals  BP (!) 151/93 (BP 1 Location: Left arm, BP Patient Position: Sitting)   Pulse 85 Resp 16   Ht 5' 4.5\" (1.638 m)   Wt (!) 172.6 kg (380 lb 9.6 oz)   SpO2 98%   BMI 64.32 kg/m²       Physical Examination: General appearance - alert, well appearing, and in no distress  Mental status - alert, oriented to person, place, and time  Eyes - pupils equal and reactive, extraocular eye movements intact  Ears - bilateral TM's and external ear canals normal  Nose - normal and patent, no erythema, discharge or polyps  Mouth - mucous membranes moist, pharynx normal without lesions  Neck - supple, no significant adenopathy  Lymphatics - no palpable lymphadenopathy, no hepatosplenomegaly  Chest - clear to auscultation, no wheezes, rales or rhonchi, symmetric air entry  Heart - normal rate, regular rhythm, normal S1, S2, no murmurs, rubs, clicks or gallops  Abdomen - soft, nontender, nondistended, no masses or organomegaly  Back exam - full range of motion, no tenderness, palpable spasm or pain on motion  Neurological - alert, oriented, normal speech, no focal findings or movement disorder noted  Musculoskeletal - no joint tenderness, deformity or swelling  Extremities - peripheral pulses normal, no pedal edema, no clubbing or cyanosis  Skin - normal coloration and turgor, no rashes, no suspicious skin lesions noted    Labs:             Assessment:     Morbid obesity with associated comorbidity     Plan:     Continuation of Pre-Operative evaluation / clearance. Miley Cooney has returned to the office today to discuss her status as a surgical candidate.  her progress has been noted and reviewed. We will continue the pre-operative process and work towards goals as outlined. she has 2 more nutritional visits to complete before proceeding to the OR  she has an outstanding psych clearance to review before proceeding to the OR. Her psych evaluation is planned for next month. Miley Cooney understand the rationales for all the above.   It has been discussed that given her obese condition that the best surgical option for this patient would be the laparoscopic gastric bypass surgery. Jasmin Jesus agrees with the surgical choice and has been educated in it's; risks, benefits, and alternatives. We will continue with the pre-operative evaluation as needed to check progress. The patient understands the plan of action    Colonscopy from Feb 2020    Procedure:               Colonoscopy  Providers:               oBb Grimaldo MD (Doctor),                            Elisha Duff RN (Nurse),                            Sukhdeep Cash RN (Technician)  Attending Participation: I personally performed the entire procedure. Medicines:               See the Anesthesia note for                            documentation of the administered                            medications  Scope In: 4:10:33 PM  Scope Out: 4:27:15 PM  Scope Withdrawal Time 0 hours 12 minutes 1 second   Complications:           No immediate complications. Pre-Operative Diagnosis:Screening for colorectal malignant neoplasm    Procedure:    Pre-Anesthesia Assessment:  Prior to the procedure, a History and Physical was performed, and patient medications and allergies were reviewed. The patient's tolerance of previous anesthesia was also reviewed. The risks and benefits of the procedure and the sedation options and risks were discussed with the patient. All questions were answered, and informed consent was obtained. Prior Anticoagulants: The patient has taken no previous anticoagulant or antiplatelet agents. ASA Grade Assessment: III - A patient with severe systemic disease. After reviewing the risks and benefits, the patient was deemed in satisfactory condition to undergo the procedure. After I obtained informed consent, the scope was passed under direct vision. Throughout the procedure, the patient's blood pressure, pulse, and oxygen saturations were monitored continuously.  The [id number] Colonoscope was introduced through the anus and advanced to the cecum, identified by appendiceal orifice and ileocecal valve. The colonoscopy was performed without difficulty. The patient tolerated the procedure well. The quality of the bowel preparation was good. The ileocecal valve, appendiceal orifice, and rectum were photographed. Findings:  A 10 mm polyp was found in the recto-sigmoid colon. The polyp was sessile. The polyp was removed with a cold biopsy forceps. Resection and retrieval were complete. Estimated blood loss was minimal. Multiple small-mouthed diverticula were found in the sigmoid colon, descending colon and splenic flexure. There was no evidence of diverticular bleeding. Non-bleeding internal hemorrhoids were found during retroflexion. The hemorrhoids were mild, small and Grade I (internal hemorrhoids that do not prolapse). Post-Operative DIagnosis:       - One 10 mm polyp at the recto-sigmoid colon, removed with a cold biopsy forceps. Resected and retrieved. - Mild diverticulosis in the sigmoid colon, in the descending colon and at the splenic flexure. There was no evidence of diverticular bleeding.       - Non-bleeding internal hemorrhoids. Estimated Blood Loss:       Estimated blood loss: none. Follow-up CT scan in March 2020     Result Impression     1.  No evidence of metastatic disease in the abdomen or pelvis. 2.  Hepatic steatosis. 3.  Cholecystectomy. 4.  Mild colonic diverticulosis without evidence of acute diverticulitis. Signed By: Esther Morgan MD on 3/5/2020 2:44 PM   Result Narrative   Indication: Malignant carcinoid tumor of the rectum. Secondary Diagnoses:     Dietary Intervention  - The patient is currently scheduled to see or has been followed by a bariatric nutritionist for an attempt at preoperative weight loss as has been dictated by their insurance carrier.   They will be assessed at various times during their follow up to evaluate their progress depending on the length of time that is required once again by their carrier. I have explained the importance of preoperative weight loss and the benefits regarding lower surgical risk and also assisting the patient in reaching their weight loss goal.  Finally they understand their is a physiologic benefit from the standpoint of hepatic volume reduction preoperatively.   I have reiterated the importance of a low carbohydrate and high protein regimen to achieve their stated goal.      Signed By: ZACHERY Duran     January 11, 2021

## 2021-02-05 ENCOUNTER — CLINICAL SUPPORT (OUTPATIENT)
Dept: SURGERY | Age: 41
End: 2021-02-05

## 2021-02-05 VITALS — BODY MASS INDEX: 48.82 KG/M2 | WEIGHT: 293 LBS | HEIGHT: 65 IN

## 2021-02-05 DIAGNOSIS — E66.01 MORBID OBESITY (HCC): Primary | ICD-10-CM

## 2021-02-05 NOTE — PROGRESS NOTES
Medical Weight Loss Multi-Disciplinary Program    Name: Miley Cooney   : 1980    Session# 6- revision - [facility] - needs 1 more med visit - reminded pt   Date: 2021    Visit Vitals  Ht 5' 4.5\" (1.638 m)   Wt (!) 171.1 kg (377 lb 3.2 oz)   BMI 63.75 kg/m²   *Self-reported weight. Last office visit : 385 lbs    10-20 pound weight loss goal - consult weight 373 lbs (weight loss goal = 353-363 lbs). 2020: Pt has gained ~10 pounds since consult weight. Pt has stopped phentermine due to no weight loss (after 6 months on it). Pt states that she lost the hard-copy nutrition packet that was mailed to her. Dietary Instructions    Reviewed intake  Understanding low carbohydrates, low sugar, higher protein meals  Instruction given for personal dietary changes  Discussed perceived compliance  Comments: Today we spent majority of session reviewing key diet principles and beginning to discuss post operative diet advancement guidelines. Reinforcing the importance of adequate hydration and protein intake - emphasizing the role of protein supplements in the post operative diet. Discussed vitamin and mineral supplementation forever following surgery. Encouraged patient to review key diet principles of surgery and we will discuss individual questions or concerns. Patient was receptive to education and we will continue to review and reinforce in our follow-up next month.       2021: pt has cut out junk-food snacking, she is snacking on pickles or boiled eggs or peanut butter and celery (instead of chips/snickers). Some discrepancy between reported weight and office weight - pt states our office scale weighs her 10 pounds more than her home-scale. Pt open to consider bypass due to reflux risk with sleeve. Pt has started to review the nutrition handouts this month (improvement). Pt plans to avoid meal skipping this month and eliminate SSB (using water, crystal light, & Protein shakes). Nutritional Hx: What is the number of meals you eat per day? 2 -3 - continues but improvements  Comment: *Pt has gained 25 pounds during COVID outbreak    Do you eat between meals / snack? yes  Typical snack: chips, chocolate (snickers), cereal & milk, ice cream - eliminated - switched to veggies, fruits, protein    How fast do you eat your meals? rapid    How often do you eat fast food, restaurant food, or take out? 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 1 (16 oz) soday/day [facility]), 1   How many caffeinated drinks do you have per day? 1    How much milk and/or juice do you drink per day? 2% Milk with cereal throughout day + ice cream     How much water do you drink per day? Increased - 3-5 bottles water/day (~32 oz/day), sometimes Gatorade     How often do you consume alcohol? never;     Current Vitamins: 5,000 Vit D, Tumeric, Fish oil (500 mg/day),  Iron 325 mg/day    Diet History:  Typical intake is as follows:  Breakfast: cereal + milk OR 2 scrambled eggs w/ cheese + 2 pieces toast OR 2 slices frozen pizza OR 3 eggs + sharp cheddar cheese  Lunch: SKIPS OR tuna fish packs  Dinner: Grilled Ayan Chicken + lettuce + sweet tea  Snacks: cut out chips/snickers - applesauce & watermelon (knows this is a carb), boiled egg, PB and celery   Fluids: Diet Sunkist, soda  Physical Activity/Exercise    Discussed Perceived Compliance  Motivation    Patient has not started physical activity regimen, reinforced the importance of regular physical activity for total health and weight management. Suggested starting walking or chair exercises for at least 3-7 days per week for 30-60 minutes, patient was receptive to recommendation. Pt states she has sciatica pain causing lack of physical activity.      Behavior Modification    Identify obstacles to trigger change  Achieving/Rewarding goals met  Positive attitude  Comments: Reinforced importance continuing to modify lifestyle patterns and behaviors to promote weight loss and long term weight maintenance       Goals:   1. Work to increase to 3-4 small meals per day, with planned snacks as needed. Recommend following plate method for meal planning - focusing on lean protein, non-starchy vegetables, and measured amounts of starch. - Goal of  g protein and  g carbohydrate per day. - Recommend continuing protein supplement as meal replacement at least 1x/day OR as high protein snack option  2. Increase non caloric fluid to 64 oz per day. Eliminate caffeine, added sugar, carbonation, and straws.               -Continue to work to decrease sugar sweetened beverages - goal of calorie free beverages only              -Must eliminate caffeine prior to surgery and avoid for ~6-8 weeks   -Practice 30:30 rule,  food and flood   3. Start activity regimen, work to increase ADL  4. Start Complete MVI    Candidate for surgery (per RD):  PENDING    Dietitian: Ilia Wray RD

## 2021-02-18 ENCOUNTER — OFFICE VISIT (OUTPATIENT)
Dept: SURGERY | Age: 41
End: 2021-02-18
Payer: MEDICAID

## 2021-02-18 ENCOUNTER — HOSPITAL ENCOUNTER (OUTPATIENT)
Dept: LAB | Age: 41
Discharge: HOME | End: 2021-02-18

## 2021-02-18 VITALS
HEART RATE: 88 BPM | SYSTOLIC BLOOD PRESSURE: 149 MMHG | DIASTOLIC BLOOD PRESSURE: 79 MMHG | BODY MASS INDEX: 48.82 KG/M2 | OXYGEN SATURATION: 98 % | WEIGHT: 293 LBS | TEMPERATURE: 98.5 F | HEIGHT: 65 IN

## 2021-02-18 DIAGNOSIS — K21.9 GASTROESOPHAGEAL REFLUX DISEASE, UNSPECIFIED WHETHER ESOPHAGITIS PRESENT: ICD-10-CM

## 2021-02-18 DIAGNOSIS — M19.90 ARTHRITIS: ICD-10-CM

## 2021-02-18 DIAGNOSIS — E66.01 MORBID OBESITY WITH BMI OF 60.0-69.9, ADULT (HCC): ICD-10-CM

## 2021-02-18 DIAGNOSIS — E66.01 MORBID OBESITY (HCC): Primary | ICD-10-CM

## 2021-02-18 LAB — SENTARA SPECIMEN COL,SENBCF: NORMAL

## 2021-02-18 PROCEDURE — 99214 OFFICE O/P EST MOD 30 MIN: CPT | Performed by: NURSE PRACTITIONER

## 2021-02-18 PROCEDURE — 99001 SPECIMEN HANDLING PT-LAB: CPT

## 2021-02-18 NOTE — PATIENT INSTRUCTIONS
Patient Instructions 1. Remember hydration goals - minimum of 64 ounces of liquids per day (dehydration is the number one reason for hospital readmission). 2. Continue to monitor carbohydrate and protein intake you need a minimum of  Grams of protein daily- remember to keep your total carbohydrates to 50 grams or less per day for best results. 3. Continue to work towards exercise goals - 60-90 minutes, 5 times a week minimum of deliberate, aerobic exercise is the ultimate goal with strength training 2 times each week. Refer to [employer] for  information. 4. Remember to take vitamins as directed. 5. Attend support group the 2nd Thursday of each month. 6.  Constipation: Milk of Magnesia is for immediate relief only. Miralax is to be used every day if constipation is a chronic problem. 7.  Diarrhea: patients will occasionally develop lactose intolerance after surgery. Check to see if your protein shake has whey in it. If it does try a protein powder or drink that does not have whey and stop all yogurts, cheeses and milks to see if the diarrhea goes away. 8.  If you have had labs drawn. We will only call you if you have abnormal results. Otherwise you can access the lab results in \"mychart\". You will only need the access code the first time you sign on. 9.  Call us at [phone] or email us through [email or web address]" with questions,     concerns or worsening of condition, we have someone on call 24 hours a day. If you are unable to reach our office, you are to go to your Primary Care Physician or the Emergency Department. NOTE TO GASTRIC BYPASS PATIENTS:  (SAME APPLIES TO GASTRIC SLEEVE PATIENTS FOR FIRST TWO MONTHS) Remember that for the rest of your life, you are not able to take the following: 
- NSAIDs (ibuprofen, goody powder, BC powder, Motrin, Advil, Mobic, Voltaren, Excedrin, etc.) - Steroid pills or injections - Smoke (cigarettes or recreational drugs) - Alcohol Use of any of the above may cause ulcers in your stomach which may perforate causing a medical emergency and surgery. Speak to our medical staff if another medical provider requires you to take steroids or NSAIDs. Supplement Resource Guide Importance of Protein:  
Maintains lean body mass, produces antibodies to fight off infections, heals wounds, minimizes hair loss, helps to give you energy, helps with satiety, and keeping you full between meals. Importance of Calcium: 
Needed for healthy bones and teeth, normal blood clotting, and nervous system functioning, higher risk of osteoporosis and bone disease with non-compliance. Importance of Multivitamins: Many functions. Supply you with extra nutrients that you may be missing from food. May lead to iron deficiency anemia, weakness, fatigue, and many other symptoms with non-compliance. Importance of B Vitamins: 
Important for red blood cell formation, metabolism, energy, and helps to maintain a healthy nervous system. Protein Supplement Find one you like now. Use immediately after surgery. Look for: 
35-50g protein each day from your protein supplement once you reach the progression diet. 0-3 g fat per serving 0-3 g sugar per serving Protein drinks should be split in separate dosages. Recommend: Lifelong 1 year + Calcium Supplement:  
 
Start taking within a month after surgery. Look for: Calcium Citrate Plus D (1500 mg per day) Recommend: Citracal 
 
 . Avoid chocolate chewable calcium. Can use chewable bariatric or [employer] brand or similar chewable. The body cannot absorb more than 500-600 mg of calcium at a time. Take for Life Multi-vitamin Supplement:   
 
Start immediately after surgery: any complete chewable, such as: Eppss Complete chewables. Avoid Carter sours or gummies. They lack iron and other important nutrients and also have added sugar. Continue with chewable vitamin or change to adult complete multivitamin one month after surgery. Menstruating women can take a prenatal vitamin. Make sure has at least 18 mg iron and 173-725 mcg folic acid Vitamin B12, B Complex Vitamin, and Biotin Start taking within a month after surgery. Vitamin B12:  1000 mcg of Vitamin B12 three times weekly Must take sublingually (meaning you take it under your tongue) or in a liquid drop form for easy absorption. B Complex Vitamin: Take a pill or liquid drop form once daily. Biotin: This vitamin can help prevent hair loss. Recommend 5mg  
(5000 mcg) a day Biotin is Optional  
 
 
 
 
  
Learning About Physical Activity What is physical activity? Physical activity is any kind of activity that gets your body moving. The types of physical activity that can help you get fit and stay healthy include: · Aerobic or \"cardio\" activities that make your heart beat faster and make you breathe harder, such as brisk walking, riding a bike, or running. Aerobic activities strengthen your heart and lungs and build up your endurance. · Strength training activities that make your muscles work against, or \"resist,\" something, such as lifting weights or doing push-ups. These activities help tone and strengthen your muscles. · Stretches that allow you to move your joints and muscles through their full range of motion. Stretching helps you be more flexible and avoid injury. What are the benefits of physical activity? Being active is one of the best things you can do for your health. It helps you to: · Feel stronger and have more energy to do all the things you like to do. · Focus better at school or work. · Feel, think, and sleep better. · Reach and stay at a healthy weight. · Lose fat and build lean muscle. · Lower your risk for serious health problems. · Keep your bones, muscles, and joints strong. How can you make physical activity part of your life? Get at least 30 minutes of exercise on most days of the week. Walking is a good choice. You also may want to do other activities, such as running, swimming, cycling, or playing tennis or team sports. Pick activities that you likeones that make your heart beat faster, your muscles stronger, and your muscles and joints more flexible. If you find more than one thing you like doing, do them all. You don't have to do the same thing every day. Get your heart pumping every day. Any activity that makes your heart beat faster and keeps it at that rate for a while counts. Here are some great ways to get your heart beating faster: · Go for a brisk walk, run, or bike ride. · Go for a hike or swim. · Go in-line skating. · Play a game of touch football, basketball, or soccer. · Ride a bike. · Play tennis or racquetball. · Climb stairs. Even some household chores can be aerobicjust do them at a faster pace. Vacuuming, raking or mowing the lawn, sweeping the garage, and washing and waxing the car all can help get your heart rate up. Strengthen your muscles during the week. You don't have to lift heavy weights or grow big, bulky muscles to get stronger. Doing a few simple activities that make your muscles work against, or \"resist,\" something can help you get stronger. For example, you can: · Do push-ups or sit-ups, which use your own body weight as resistance. · Lift weights or dumbbells or use stretch bands at home or in a gym or community center. Stretch your muscles often. Stretching will help you as you become more active. It can help you stay flexible, loosen tight muscles, and avoid injury. It can also help improve your balance and posture and can be a great way to relax. Be sure to stretch the muscles you'll be using when you work out. It's best to warm your muscles slightly before you stretch them. Walk or do some other light aerobic activity for a few minutes, and then start stretching. When you stretch your muscles: · Do it slowly. Stretching is not about going fast or making sudden movements. · Don't push or bounce during a stretch. · Hold each stretch for at least 15 to 30 seconds, if you can. You should feel a stretch in the muscle, but not pain. · Breathe out as you do the stretch. Then breathe in as you hold the stretch. Don't hold your breath. If you're worried about how more activity might affect your health, have a checkup before you start. Follow any special advice your doctor gives you for getting a smart start. Where can you learn more? Go to http://www.gray.com/ Enter L701 in the search box to learn more about \"Learning About Physical Activity. \" Current as of: January 16, 2020               Content Version: 12.6 © 6329-9688 [employer]. Care instructions adapted under license by [employer] (which disclaims liability or warranty for this information). If you have questions about a medical condition or this instruction, always ask your healthcare professional. [address] any warranty or liability for your use of this information.

## 2021-02-18 NOTE — PROGRESS NOTES
Bariatric Surgery Consultation    Subjective:     Nadeen Calvert is a 47 y.o. obese female with a Body mass index is 63.6 kg/m². [facility] desires surgery at this time because of health issues and quality of life issues. Nadeen Calvert has been seen by a bariatric nutritionist and has been placed on an appropriate low carbohydrate diet. The patient desires laparoscopic sleeve gastrectomy for surgical weight loss, however she is not currently a surgical candidate due to pending work up. Nadeen Calvert is here today to check progress with weight loss / evaluate nutritional status and review all subspecialty clearances in hopes of proceeding to the operating room. Office visit notes from August 2020 to present have been reviewed. Nadeen Calvert has increased fluid intake, is focusing on protein, is eating regularly, is taking a multivitamin & has increased her activity. No recent visits with PCP. No new medications. She had her clarence claribel placed gastric band removed by that surgeon approx 8 years ago.     She has been seeking a sleeve resection via this office. At the last visit in January she stated she wanted to proceed with a gastric bypass with a severe GERD and a BMI of 64, however, returns today only wanting to have sleeve gastrectomy. We have had 2 discussions now that the sleeve gastrectomy is an inferior operation as a conversion procedure, especially with her history of reflux.  She however, still wishes to proceed only with that procedure.       Patient Active Problem List    Diagnosis Date Noted    Diverticulosis     Edema     Elevated BP without diagnosis of hypertension     Morbid obesity ([facility] [address]     Morbid obesity with BMI of 60.0-69.9, adult ([address]     Acid reflux     Arthritis     Anemia     GERD (gastroesophageal reflux disease)       Past Surgical History:   Procedure Laterality Date    HX CHOLECYSTECTOMY      HX GI      lap band placed and removed 2008 and 2012    HX GYN      D & C    HX ORTHOPAEDIC      right knee surgery    HX TUBAL LIGATION        Social History     Tobacco Use    Smoking status: Never Smoker    Smokeless tobacco: Never Used   Substance Use Topics    Alcohol use: Not Currently     Comment: social      Family History   Problem Relation Age of Onset    Diabetes Father       Current Outpatient Medications   Medication Sig Dispense Refill    ARIPiprazole (ABILIFY) 5 mg tablet TK 1 T PO QD      FLUoxetine (PROzac) 40 mg capsule TK 2 CS PO Q DAY      triamterene-hydroCHLOROthiazide (MAXZIDE) 37.5-25 mg per tablet as needed.  omeprazole (PRILOSEC) 40 mg capsule TK ONE C PO QD BEFORE A MEAL      ergocalciferol (VITAMIN D2) 50,000 unit capsule Take 50,000 Units by mouth.  ferrous sulfate (IRON) 325 mg (65 mg iron) tablet Take 325 mg by mouth Daily (before breakfast).        No Known Allergies       Review of Systems:        General - No history or complaints of unexpected fever, chills, or weight loss  Head/Neck - No history or complaints of headache, diplopia, dysphagia, hearing loss  Cardiac - No history or complaints of chest pain, palpitations, murmur, or shortness of breath  Pulmonary - No history or complaints of shortness of breath, productive cough, hemoptysis  Gastrointestinal - has reflux controlled with PPI,  abdominal pain, obstipation/constipation, blood per rectum  Genitourinary - No history or complaints of hematuria/dysuria, stress urinary incontinence symptoms, or renal lithiasis  Musculoskeletal - has joint pain in back and knees, no muscular weakness  Hematologic - No history or complaints of bleeding disorders, blood transfusions, sickle cell anemia  Neurologic - No history or complaints of  migraine headaches, seizure activity, syncopal episodes, TIA or stroke  Integumentary - No history or complaints of rashes, abnormal nevi, skin cancer  Gynecological - No menses in 6 months    Objective:     Visit Vitals  BP (!) 149/79 (BP 1 Location: Right lower arm, BP Patient Position: Sitting, BP Cuff Size: Adult)   Pulse 88   Temp 98.5 °F (36.9 °C)   Ht 5' 4.5\" (1.638 m)   Wt (!) 170.7 kg (376 lb 5 oz)   SpO2 98%   BMI 63.60 kg/m²       Physical Exam:      General appearance:  alert, cooperative, no distress, appears stated age   Mental status   alert, oriented to person, place, and time   Neck  supple, no significant adenopathy     Lymphatics  no palpable lymphadenopathy, no hepatosplenomegaly   Chest  clear to auscultation, no wheezes, rales or rhonchi, symmetric air entry   Heart  normal rate, regular rhythm, normal S1, S2, no murmurs, rubs, clicks or gallops    Abdomen: soft, nontender, nondistended, no masses or organomegaly   Incision:  healing well, no drainage, no erythema, no hernia, no seroma, no swelling, no dehiscence, incision well approximated      Neurological  alert, oriented, normal speech, no focal findings or movement disorder noted   Musculoskeletal no joint tenderness, deformity or swelling   Extremities peripheral pulses normal, no pedal edema, no clubbing or cyanosis   Skin normal coloration and turgor, no rashes, no suspicious skin lesions noted       Labs:     No results found for this or any previous visit (from the past 2016 hour(s)). Assessment:     Morbid obesity with associated comorbidity, severe central obesity & outstanding insurance requirements. Plan: To continue current medications & routine follow-up with PCP. Continuation of Pre-Operative evaluation / clearance:  Zoie Mix has returned to the office today to discuss her status as a surgical candidate. Progress has been noted and reviewed - including review of notes from dietician. Zoie Mix is being compliant with follow-up & recommendations. Zoie Mix has 13-23 more pounds to lose before proceeding to the OR.   (4 pounds lost since last visit)  Kailyn Persaud has 0 more nutritional visits to complete before proceeding to the OR. Additionally, 0 more medical visits are required. Kailyn Persaud has an outstanding psychological clearance to review before proceeding to the OR. Kailyn Persaud will return in 1 month to continue the pre-operative process and to work towards goals as outlined. Kailyn Persaud understand the rationales for all the above and plans to follow the diet & activity recommendations of the dietician. It has been discussed that given her morbidly obese condition that the best surgical option for this patient would be the laparoscopic sleeve gastrectomy. Kailyn Persaud agrees with the surgical choice and has been educated in it's; risks, benefits, and alternatives. We will continue with the pre-operative evaluation as needed to check progress. Once again we have discussed with the patient the fact that the sleeve gastrectomy is an inferior conversion procedure.  She understands that the gastric bypass would be better and likely lead to a more significant weight loss and better control of her reflux.  Despite my counseling of her she still wishes to only proceed with the sleeve gastrectomy understanding the potential weight loss pitfall regarding this operation.  She understands that she needs to perform well in the preoperative phase and I have given her the task of a 10 to 20 pound weight loss before surgery. Secondary Diagnoses:     Dietary Intervention  - The patient is currently followed by a bariatric nutritionist for an attempt at preoperative weight loss as has been dictated by their insurance carrier. They will be assessed at various times during their follow up to evaluate their progress depending on the length of time that is required once again by their carrier.   I have explained the importance of preoperative weight loss and the benefits regarding lower surgical risk and also assisting the patient in reaching their weight loss goal.  Finally they understand there is a physiologic benefit from the standpoint of hepatic volume reduction preoperatively.  I have reiterated the importance of a low carbohydrate and high protein regimen to achieve their stated goal.The patients weight loss goal pre operatively is 10-20 pounds.     GERD -The patient understands that weight loss surgery is not a guaranteed cure for reflux disease but does understand the benefits that weight loss can have on reflux disease.  They also understand that at the time of surgery the gastroesophageal junction will be evaluated for the presence of a diaphragmatic hernia.  Hernias will be corrected always with the gastric band and sleeve gastrectomy procedures, but only on a case by case basis with the gastric bypass.  The patient also understands that neither weight loss surgery nor repair of a diaphragmatic hernia repair guarantees the complete cessation of the disease.     Weight Related Arthritis -The patient understands the benefits that weight loss surgery can have on their arthritis but also understands that weight loss is not a guaranteed cure and relief of symptoms is often dependent on the severity of the underlying disease.  The patient also understands that traditional pharmaceutical treatments for this diagnosis are usually unavailable to post-operative weight loss patients due to the effects on the gastrointestinal tract.  Any changes to the patient’s medication treatment will ultimately be made the patient’s PCP with input by our office.    Ms. ZEPEDA has a reminder for a \"due or due soon\" health maintenance. I have asked that she contact her primary care provider for follow-up on this health maintenance.      Signed By: Jeimy Monahan NP     February 18, 2021

## 2021-02-19 LAB
A-G RATIO,AGRAT: 1.3 RATIO (ref 1.1–2.6)
ALBUMIN SERPL-MCNC: 4.1 G/DL (ref 3.5–5)
ALP SERPL-CCNC: 164 U/L (ref 25–115)
ALT SERPL-CCNC: 16 U/L (ref 5–40)
ANION GAP SERPL CALC-SCNC: 12.4 MMOL/L (ref 3–15)
AST SERPL W P-5'-P-CCNC: 29 U/L (ref 10–37)
BILIRUB SERPL-MCNC: 0.3 MG/DL (ref 0.2–1.2)
BUN SERPL-MCNC: 9 MG/DL (ref 6–22)
CALCIUM SERPL-MCNC: 9 MG/DL (ref 8.4–10.5)
CHLORIDE SERPL-SCNC: 103 MMOL/L (ref 98–110)
CO2 SERPL-SCNC: 25 MMOL/L (ref 20–32)
CREAT SERPL-MCNC: 0.7 MG/DL (ref 0.5–1.2)
ERYTHROCYTE [DISTWIDTH] IN BLOOD BY AUTOMATED COUNT: 16.5 % (ref 10–15.5)
GFRAA, 66117: >60
GFRNA, 66118: >60
GLOBULIN,GLOB: 3.2 G/DL (ref 2–4)
GLUCOSE SERPL-MCNC: 89 MG/DL (ref 70–99)
HCT VFR BLD AUTO: 41 % (ref 35.1–46.5)
HGB BLD-MCNC: 11.4 G/DL (ref 11.7–15.5)
HYPOCHROMIA, 12007: ABNORMAL
IMMATURE PLATELET FRACTION: 14.3 % (ref 1.1–7.1)
MCH RBC QN AUTO: 23 PG (ref 26–34)
MCHC RBC AUTO-ENTMCNC: 28 G/DL (ref 31–36)
MCV RBC AUTO: 81 FL (ref 81–99)
PLATELET # BLD AUTO: 240 K/UL (ref 140–440)
PMV BLD AUTO: 13.2 FL (ref 9–13)
POTASSIUM SERPL-SCNC: 4.6 MMOL/L (ref 3.5–5.5)
PROT SERPL-MCNC: 7.3 G/DL (ref 6.4–8.3)
RBC # BLD AUTO: 5.05 M/UL (ref 3.8–5.2)
SMEAR EVAL, 1131: ABNORMAL
SODIUM SERPL-SCNC: 140 MMOL/L (ref 133–145)
T4 FREE SERPL-MCNC: 1.3 NG/DL (ref 0.9–1.8)
TSH SERPL DL<=0.005 MIU/L-ACNC: 1.28 MCU/ML (ref 0.27–4.2)
WBC # BLD AUTO: 7.1 K/UL (ref 4–11)

## 2021-02-23 DIAGNOSIS — D64.9 ANEMIA, UNSPECIFIED TYPE: Primary | ICD-10-CM

## 2021-02-23 LAB
FERRITIN SERPL-MCNC: 129 NG/ML (ref 10–291)
H PYLORI AB, IGG,3341A: <0.89 INDEX
IRON,IRN: 42 MCG/DL (ref 30–160)

## 2021-03-03 VITALS — BODY MASS INDEX: 48.82 KG/M2 | HEIGHT: 65 IN | WEIGHT: 293 LBS

## 2021-03-04 ENCOUNTER — CLINICAL SUPPORT (OUTPATIENT)
Dept: SURGERY | Age: 41
End: 2021-03-04

## 2021-03-04 DIAGNOSIS — E66.01 MORBID OBESITY (HCC): Primary | ICD-10-CM

## 2021-03-04 NOTE — PROGRESS NOTES
Medical Weight Loss Multi-Disciplinary Program    Name: Donis Schilder   : 1980    Session# 6  Date: 3/3/2021    Visit Vitals  Ht 5' 4.5\" (1.638 m)   Wt (!) 170.7 kg (376 lb 5.2 oz)   BMI 63.60 kg/m²     *Pt's weight is self-reported due to tele-health nutrition visits during [address] pandemic. Dietary Instructions    Reviewed intake  Understanding low carbohydrates, low sugar, higher protein meals  Instruction given for personal dietary changes  Discussed perceived compliance  Comments: RD Reviewed Diet History and Physical Activity/Exercise habits. Recommended dietary changes discussed for both before and after surgery. Reviewed recommendation to follow [id number] calorie diet, working to reduce total carbohydrate intake to  g or less per day and increasing protein intake to  g per day, compared current intake to recommendations. Recommend pt adopt an exercise routine of at least 3-5 days a week for at least 30 minutes/day. If pt unable to participate in walking, zac, swimming, or other exercises, recommend pt work with a physical therapist and/or participate in chair exercises, yoga, and/or increase activities of daily living as able. Patient participated in pre-recorded education class video. Recorded video of dietitian discussing key diet principles and reviewing recommendations preparing for bariatric surgery. Reviewed importance of small structured meals, adequate hydration,  food and fluid, supplementation of vitamins/minerals following surgery. Reviewed tips and recommendations for tolerance and behavior modifications to begin initiating now. Patient has previously received pre-operative education packet that reviewed these topics, but discussed in details the role of dietary and behavior changes to promote optimal long term weight loss following bariatric surgery.      Patient watched the video in its entirety and turned in the 3 embedded passcodes from the video session. Pt submitted bariatric quiz which requires at least 80% pass-rate for surgical approval from RD. Pt also submitted \"homework\" from this videos session - answering nutrition and exercise questions related to their behavior changes, goals, and the recommendations. Behavior Modification    Identify obstacles to trigger change  Achieving/Rewarding goals met  Positive attitude  Comments: Reinforced importance continuing to modify lifestyle patterns and behaviors to promote weight loss and long term weight maintenance     Comments:  During today's lesson discussed post-operative key diet principles and reviewed dietary and behavior changes to begin making now    Physical Activity/Exercise    Discussed Perceived Compliance  Motivation    Patient has been educated on the importance of started physical activity regimen, reinforced the importance of regular physical activity for total health and weight management. Suggested starting exercise regimen of cardiovascular and resistance training for at least 3-5 days per week for 30-60 minutes, patient was receptive to recommendation. Goals:   1. Work to increase to 3-4 small meals per day, with planned snacks as needed. Recommend following plate method for meal planning - focusing on lean protein, non-starchy vegetables, and measured amounts of starch. - Goal of  g protein and  g carbohydrate per day. - Recommend continuing protein supplement as meal replacement at least 1x/day OR as high protein snack option  2. Increase non caloric fluid to 64 oz per day. Eliminate caffeine, added sugar, carbonation, and straws.               -Continue to work to decrease sugar sweetened beverages - goal of calorie free beverages only              -Must eliminate caffeine prior to surgery and avoid for ~6-8 weeks   -Practice 30:30 rule,  food and flood   3.  Start activity regimen, work to increase ADL  4. Start Complete MVI    Candidate for surgery (per RD): YES - Pt acknowledges understanding that bariatric surgery requires  lifelong adherence to dietary and exercise behavior change recommendations in order to be successful with weight loss and weight-loss maintenance. Pt demonstrates understanding of post-op key diet principles and recommendations. Pt passed bariatric quiz with at least 80% pass rate.

## 2021-03-17 ENCOUNTER — APPOINTMENT (OUTPATIENT)
Dept: GENERAL RADIOLOGY | Age: 41
End: 2021-03-17
Attending: SPECIALIST
Payer: MEDICAID

## 2021-03-17 ENCOUNTER — HOSPITAL ENCOUNTER (OUTPATIENT)
Age: 41
Setting detail: OUTPATIENT SURGERY
Discharge: HOME | End: 2021-03-17
Attending: SPECIALIST | Admitting: SPECIALIST
Payer: MEDICAID

## 2021-03-17 VITALS
DIASTOLIC BLOOD PRESSURE: 86 MMHG | HEART RATE: 80 BPM | TEMPERATURE: 98.7 F | RESPIRATION RATE: 16 BRPM | OXYGEN SATURATION: 99 % | SYSTOLIC BLOOD PRESSURE: 143 MMHG | WEIGHT: 293 LBS | BODY MASS INDEX: 63.85 KG/M2

## 2021-03-17 DIAGNOSIS — E66.01 MORBID OBESITY (HCC): ICD-10-CM

## 2021-03-17 DIAGNOSIS — K21.9 GASTROESOPHAGEAL REFLUX DISEASE, UNSPECIFIED WHETHER ESOPHAGITIS PRESENT: ICD-10-CM

## 2021-03-17 PROCEDURE — 74240 X-RAY XM UPR GI TRC 1CNTRST: CPT | Performed by: SPECIALIST

## 2021-03-17 PROCEDURE — 74240 X-RAY XM UPR GI TRC 1CNTRST: CPT

## 2021-03-17 PROCEDURE — 74011000250 HC RX REV CODE- 250: Performed by: SPECIALIST

## 2021-03-17 PROCEDURE — 76040000019: Performed by: SPECIALIST

## 2021-03-17 NOTE — PROCEDURES
Magnolia Bardales   : 1980  Medical Record [id number]            PREPROCEDURE DIAGNOSIS: This patient is preoperative for laparoscopic sleeve gastrectomyprocedure with a history of  reflux disease. POSTPROCEDURE DIAGNOSIS: This patient is preoperative for laparoscopic sleeve gastrectomyprocedure with a history of  reflux disease. PROCEDURES PERFORMED: Upper GI study with barium. ESTIMATED BLOOD LOSS: None. SPECIMENS: None. STATEMENT OF MEDICAL NECESSITY: The patient is a patient with a  longstanding history of obesity. They are now considering the laparoscopic sleeve gastrectomyprocedure as a means of surgical weight control and due to their history of reflux disease and are being assessed preoperatively for such. DESCRIPTION OF PROCEDURE: The patient was brought to the fluoroscopy unit and  was given thin barium. On swallowing of barium, they were noted to have  normal peristalsis of their esophagus. They had prompt filling of distal  esophagus with tapering into the gastroesophageal junction. There was no evidence of a hiatal hernia present. Contrast then filled the gastric cardia, fundus,body and pre pyloric region with no abnormalities noted. Contrast then exited the pylorus in normal fashion. No obstruction was noted. There was no evidence of reflux noted.     (normal anatomy - prior clarence claribel banding pt - will only consider a sleeve)    Cortes Suggs MD

## 2021-08-03 PROBLEM — K21.9 ACID REFLUX: Status: RESOLVED | Noted: 2021-08-03 | Resolved: 2021-08-03

## 2024-10-24 NOTE — H&P
[facility]  [address]  [facility] [place] [address]  Tel:  [phone]  Fax: [phone]    Patient: Tram Small    YOB: 1980   Birth Sex: Female   Date: 10/16/2024 02:30 PM   Visit Type: Office Visit - GYN     Assessment/Plan  # Detail Type Description    1. Assessment Other specified abnormal uterine bleeding (N93.8).    Patient Plan AUB: q1-6mo/up to 4-8d/heavy clots, pain.  I d/w patient possible etiologies, evaluation and management.  Obtain TVUS w/ plans for [facility] pending results. 8/12/24 TT --> No new c/o.  TVUS reviewed w/ patient.  Q/A.  She opts for a [facility] for further endometrial evaluation and sampling pending auth. 8/22/24 TT --> No new c/o.  Q/A.  Perc/Ibupro; ERx.  Proceed w/ [facility]. 10/16/24 TT  - TVUS (8/22/24): AV uterus (9.1 x 5.6 x 4.8 cm); ET (6.2 mm); ovaries wnl b/l.         2. Assessment Tubal ligation status (Z98.51).    Patient Plan Previous BTL.         3. Assessment Body mass index (BMI) 50-59.9 , adult (Z68.43).    Patient Plan Previous gastric bypass.  Currently on meds and has lost 40lbs. I emphasized the increased risks for continued estrogen production by her peripheral adipose tissue converting, via aromatization, adrenal androgens into estradiol.  Not to mention, increased risks for DM, HTN, hyperlipidemia, CAD, decreased quality of life and shorter life expectancy.  Encouraged healthy diet, exercise and lifestyle changes.  Reviewed BMI and encouraged 10% weight loss over the next year: goal -2lbs/mo.         4. Assessment Malignant neoplasm of colon, unspecified part of colon (C18.9), chronic, Stable.    Patient Plan Malignant polyps removed.  Cont meds and close f/u w/ GI/Surg.         5. Assessment Essential (primary) hypertension (I10), chronic.    Patient Plan Cont meds and close f/u w/ PCP.      [facility]. 10/16/24 TT    R/B/A d/w patient.  She understands that she is at increased risk for, but not limited to, infection,

## 2024-10-25 ENCOUNTER — TRANSCRIBE ORDERS (OUTPATIENT)
Facility: HOSPITAL | Age: 44
End: 2024-10-25

## 2024-10-25 ENCOUNTER — HOSPITAL ENCOUNTER (OUTPATIENT)
Facility: HOSPITAL | Age: 44
Discharge: HOME | End: 2024-10-28
Payer: MEDICAID

## 2024-10-25 DIAGNOSIS — N93.8 DYSFUNCTIONAL UTERINE BLEEDING: Primary | ICD-10-CM

## 2024-10-25 DIAGNOSIS — N93.8 DYSFUNCTIONAL UTERINE BLEEDING: ICD-10-CM

## 2024-10-25 LAB
EKG ATRIAL RATE: 87 BPM
EKG DIAGNOSIS: NORMAL
EKG P AXIS: -15 DEGREES
EKG P-R INTERVAL: 184 MS
EKG Q-T INTERVAL: 372 MS
EKG QRS DURATION: 82 MS
EKG QTC CALCULATION (BAZETT): 447 MS
EKG R AXIS: 60 DEGREES
EKG T AXIS: 46 DEGREES
EKG VENTRICULAR RATE: 87 BPM

## 2024-10-25 PROCEDURE — 93010 ELECTROCARDIOGRAM REPORT: CPT | Performed by: INTERNAL MEDICINE

## 2024-10-25 PROCEDURE — 93005 ELECTROCARDIOGRAM TRACING: CPT

## 2024-10-28 ENCOUNTER — ANESTHESIA EVENT (OUTPATIENT)
Facility: HOSPITAL | Age: 44
End: 2024-10-28
Payer: MEDICAID

## 2024-10-30 ENCOUNTER — HOSPITAL ENCOUNTER (OUTPATIENT)
Facility: HOSPITAL | Age: 44
Setting detail: OUTPATIENT SURGERY
Discharge: HOME | End: 2024-10-30
Attending: OBSTETRICS & GYNECOLOGY | Admitting: OBSTETRICS & GYNECOLOGY
Payer: MEDICAID

## 2024-10-30 ENCOUNTER — ANESTHESIA (OUTPATIENT)
Facility: HOSPITAL | Age: 44
End: 2024-10-30
Payer: MEDICAID

## 2024-10-30 VITALS
WEIGHT: 293 LBS | SYSTOLIC BLOOD PRESSURE: 125 MMHG | TEMPERATURE: 97.9 F | RESPIRATION RATE: 19 BRPM | BODY MASS INDEX: 50.02 KG/M2 | OXYGEN SATURATION: 100 % | HEART RATE: 74 BPM | HEIGHT: 64 IN | DIASTOLIC BLOOD PRESSURE: 70 MMHG

## 2024-10-30 PROBLEM — N93.9 ABNORMAL UTERINE BLEEDING (AUB): Chronic | Status: ACTIVE | Noted: 2024-10-30

## 2024-10-30 PROBLEM — N94.5 SECONDARY DYSMENORRHEA: Chronic | Status: ACTIVE | Noted: 2024-10-30

## 2024-10-30 LAB — HCG UR QL: NEGATIVE

## 2024-10-30 PROCEDURE — 3600000012 HC SURGERY LEVEL 2 ADDTL 15MIN: Performed by: OBSTETRICS & GYNECOLOGY

## 2024-10-30 PROCEDURE — 2709999900 HC NON-CHARGEABLE SUPPLY: Performed by: OBSTETRICS & GYNECOLOGY

## 2024-10-30 PROCEDURE — 7100000011 HC PHASE II RECOVERY - ADDTL 15 MIN: Performed by: OBSTETRICS & GYNECOLOGY

## 2024-10-30 PROCEDURE — 7100000000 HC PACU RECOVERY - FIRST 15 MIN: Performed by: OBSTETRICS & GYNECOLOGY

## 2024-10-30 PROCEDURE — 2500000003 HC RX 250 WO HCPCS: Performed by: NURSE ANESTHETIST, CERTIFIED REGISTERED

## 2024-10-30 PROCEDURE — 6360000002 HC RX W HCPCS: Performed by: OBSTETRICS & GYNECOLOGY

## 2024-10-30 PROCEDURE — 7100000001 HC PACU RECOVERY - ADDTL 15 MIN: Performed by: OBSTETRICS & GYNECOLOGY

## 2024-10-30 PROCEDURE — 88305 TISSUE EXAM BY PATHOLOGIST: CPT

## 2024-10-30 PROCEDURE — 3700000000 HC ANESTHESIA ATTENDED CARE: Performed by: OBSTETRICS & GYNECOLOGY

## 2024-10-30 PROCEDURE — 2580000003 HC RX 258: Performed by: OBSTETRICS & GYNECOLOGY

## 2024-10-30 PROCEDURE — 7100000010 HC PHASE II RECOVERY - FIRST 15 MIN: Performed by: OBSTETRICS & GYNECOLOGY

## 2024-10-30 PROCEDURE — 81025 URINE PREGNANCY TEST: CPT

## 2024-10-30 PROCEDURE — 3700000001 HC ADD 15 MINUTES (ANESTHESIA): Performed by: OBSTETRICS & GYNECOLOGY

## 2024-10-30 PROCEDURE — 6360000002 HC RX W HCPCS: Performed by: ANESTHESIOLOGY

## 2024-10-30 PROCEDURE — 3600000002 HC SURGERY LEVEL 2 BASE: Performed by: OBSTETRICS & GYNECOLOGY

## 2024-10-30 PROCEDURE — 6360000002 HC RX W HCPCS: Performed by: NURSE ANESTHETIST, CERTIFIED REGISTERED

## 2024-10-30 RX ORDER — MEPERIDINE HYDROCHLORIDE 50 MG/ML
12.5 INJECTION INTRAMUSCULAR; INTRAVENOUS; SUBCUTANEOUS AS NEEDED
Status: DISCONTINUED | OUTPATIENT
Start: 2024-10-30 | End: 2024-10-30 | Stop reason: HOSPADM

## 2024-10-30 RX ORDER — DIPHENHYDRAMINE HYDROCHLORIDE 50 MG/ML
12.5 INJECTION INTRAMUSCULAR; INTRAVENOUS
Status: DISCONTINUED | OUTPATIENT
Start: 2024-10-30 | End: 2024-10-30 | Stop reason: HOSPADM

## 2024-10-30 RX ORDER — ONDANSETRON 2 MG/ML
INJECTION INTRAMUSCULAR; INTRAVENOUS
Status: DISCONTINUED | OUTPATIENT
Start: 2024-10-30 | End: 2024-10-30 | Stop reason: SDUPTHER

## 2024-10-30 RX ORDER — SODIUM CHLORIDE 9 MG/ML
INJECTION, SOLUTION INTRAVENOUS PRN
Status: DISCONTINUED | OUTPATIENT
Start: 2024-10-30 | End: 2024-10-30 | Stop reason: HOSPADM

## 2024-10-30 RX ORDER — HYDROMORPHONE HYDROCHLORIDE 1 MG/ML
0.5 INJECTION, SOLUTION INTRAMUSCULAR; INTRAVENOUS; SUBCUTANEOUS EVERY 5 MIN PRN
Status: DISCONTINUED | OUTPATIENT
Start: 2024-10-30 | End: 2024-10-30 | Stop reason: HOSPADM

## 2024-10-30 RX ORDER — IBUPROFEN 800 MG/1
800 TABLET, FILM COATED ORAL EVERY 6 HOURS PRN
COMMUNITY

## 2024-10-30 RX ORDER — DEXAMETHASONE SODIUM PHOSPHATE 4 MG/ML
INJECTION, SOLUTION INTRA-ARTICULAR; INTRALESIONAL; INTRAMUSCULAR; INTRAVENOUS; SOFT TISSUE
Status: DISCONTINUED | OUTPATIENT
Start: 2024-10-30 | End: 2024-10-30 | Stop reason: SDUPTHER

## 2024-10-30 RX ORDER — ONDANSETRON 2 MG/ML
4 INJECTION INTRAMUSCULAR; INTRAVENOUS
Status: DISCONTINUED | OUTPATIENT
Start: 2024-10-30 | End: 2024-10-30 | Stop reason: HOSPADM

## 2024-10-30 RX ORDER — FENTANYL CITRATE 50 UG/ML
INJECTION, SOLUTION INTRAMUSCULAR; INTRAVENOUS
Status: DISCONTINUED | OUTPATIENT
Start: 2024-10-30 | End: 2024-10-30 | Stop reason: SDUPTHER

## 2024-10-30 RX ORDER — BUPIVACAINE HYDROCHLORIDE 2.5 MG/ML
INJECTION, SOLUTION EPIDURAL; INFILTRATION; INTRACAUDAL PRN
Status: DISCONTINUED | OUTPATIENT
Start: 2024-10-30 | End: 2024-10-30 | Stop reason: ALTCHOICE

## 2024-10-30 RX ORDER — IPRATROPIUM BROMIDE AND ALBUTEROL SULFATE 2.5; .5 MG/3ML; MG/3ML
1 SOLUTION RESPIRATORY (INHALATION)
Status: DISCONTINUED | OUTPATIENT
Start: 2024-10-30 | End: 2024-10-30 | Stop reason: HOSPADM

## 2024-10-30 RX ORDER — LANSOPRAZOLE 30 MG/1
CAPSULE, DELAYED RELEASE ORAL
COMMUNITY
Start: 2024-10-22

## 2024-10-30 RX ORDER — LIDOCAINE HYDROCHLORIDE 20 MG/ML
INJECTION, SOLUTION EPIDURAL; INFILTRATION; INTRACAUDAL; PERINEURAL
Status: DISCONTINUED | OUTPATIENT
Start: 2024-10-30 | End: 2024-10-30 | Stop reason: SDUPTHER

## 2024-10-30 RX ORDER — SODIUM CHLORIDE 0.9 % (FLUSH) 0.9 %
5-40 SYRINGE (ML) INJECTION PRN
Status: DISCONTINUED | OUTPATIENT
Start: 2024-10-30 | End: 2024-10-30 | Stop reason: HOSPADM

## 2024-10-30 RX ORDER — NALOXONE HYDROCHLORIDE 0.4 MG/ML
INJECTION, SOLUTION INTRAMUSCULAR; INTRAVENOUS; SUBCUTANEOUS PRN
Status: DISCONTINUED | OUTPATIENT
Start: 2024-10-30 | End: 2024-10-30 | Stop reason: HOSPADM

## 2024-10-30 RX ORDER — NICOTINE 14MG/24HR
PATCH, TRANSDERMAL 24 HOURS TRANSDERMAL
COMMUNITY

## 2024-10-30 RX ORDER — SODIUM CHLORIDE, SODIUM LACTATE, POTASSIUM CHLORIDE, CALCIUM CHLORIDE 600; 310; 30; 20 MG/100ML; MG/100ML; MG/100ML; MG/100ML
INJECTION, SOLUTION INTRAVENOUS CONTINUOUS
Status: DISCONTINUED | OUTPATIENT
Start: 2024-10-30 | End: 2024-10-30 | Stop reason: HOSPADM

## 2024-10-30 RX ORDER — SODIUM CHLORIDE 9 MG/ML
INJECTION, SOLUTION INTRAVENOUS CONTINUOUS
Status: DISCONTINUED | OUTPATIENT
Start: 2024-10-30 | End: 2024-10-30 | Stop reason: HOSPADM

## 2024-10-30 RX ORDER — OXYCODONE HYDROCHLORIDE 5 MG/1
5 TABLET ORAL
Status: DISCONTINUED | OUTPATIENT
Start: 2024-10-30 | End: 2024-10-30 | Stop reason: HOSPADM

## 2024-10-30 RX ORDER — PROPOFOL 10 MG/ML
INJECTION, EMULSION INTRAVENOUS
Status: DISCONTINUED | OUTPATIENT
Start: 2024-10-30 | End: 2024-10-30 | Stop reason: SDUPTHER

## 2024-10-30 RX ORDER — MIDAZOLAM HYDROCHLORIDE 1 MG/ML
INJECTION, SOLUTION INTRAMUSCULAR; INTRAVENOUS
Status: DISCONTINUED | OUTPATIENT
Start: 2024-10-30 | End: 2024-10-30 | Stop reason: SDUPTHER

## 2024-10-30 RX ORDER — DROPERIDOL 2.5 MG/ML
0.62 INJECTION, SOLUTION INTRAMUSCULAR; INTRAVENOUS
Status: DISCONTINUED | OUTPATIENT
Start: 2024-10-30 | End: 2024-10-30 | Stop reason: HOSPADM

## 2024-10-30 RX ORDER — SODIUM CHLORIDE 0.9 % (FLUSH) 0.9 %
5-40 SYRINGE (ML) INJECTION EVERY 12 HOURS SCHEDULED
Status: DISCONTINUED | OUTPATIENT
Start: 2024-10-30 | End: 2024-10-30 | Stop reason: HOSPADM

## 2024-10-30 RX ORDER — LABETALOL HYDROCHLORIDE 5 MG/ML
10 INJECTION, SOLUTION INTRAVENOUS
Status: DISCONTINUED | OUTPATIENT
Start: 2024-10-30 | End: 2024-10-30 | Stop reason: HOSPADM

## 2024-10-30 RX ORDER — KETOROLAC TROMETHAMINE 30 MG/ML
INJECTION, SOLUTION INTRAMUSCULAR; INTRAVENOUS
Status: DISCONTINUED | OUTPATIENT
Start: 2024-10-30 | End: 2024-10-30 | Stop reason: SDUPTHER

## 2024-10-30 RX ORDER — FENTANYL CITRATE 50 UG/ML
25 INJECTION, SOLUTION INTRAMUSCULAR; INTRAVENOUS EVERY 5 MIN PRN
Status: DISCONTINUED | OUTPATIENT
Start: 2024-10-30 | End: 2024-10-30 | Stop reason: HOSPADM

## 2024-10-30 RX ADMIN — PROPOFOL 200 MG: 10 INJECTION, EMULSION INTRAVENOUS at 10:59

## 2024-10-30 RX ADMIN — KETOROLAC TROMETHAMINE 30 MG: 30 INJECTION, SOLUTION INTRAMUSCULAR; INTRAVENOUS at 11:19

## 2024-10-30 RX ADMIN — DEXAMETHASONE SODIUM PHOSPHATE 4 MG: 4 INJECTION, SOLUTION INTRAMUSCULAR; INTRAVENOUS at 11:02

## 2024-10-30 RX ADMIN — MIDAZOLAM 2 MG: 1 INJECTION INTRAMUSCULAR; INTRAVENOUS at 10:52

## 2024-10-30 RX ADMIN — FENTANYL CITRATE 100 MCG: 50 INJECTION, SOLUTION INTRAMUSCULAR; INTRAVENOUS at 10:59

## 2024-10-30 RX ADMIN — SODIUM CHLORIDE: 9 INJECTION, SOLUTION INTRAVENOUS at 09:45

## 2024-10-30 RX ADMIN — ONDANSETRON HYDROCHLORIDE 4 MG: 2 INJECTION INTRAMUSCULAR; INTRAVENOUS at 11:05

## 2024-10-30 RX ADMIN — FENTANYL CITRATE 25 MCG: 50 INJECTION INTRAMUSCULAR; INTRAVENOUS at 11:38

## 2024-10-30 RX ADMIN — LIDOCAINE HYDROCHLORIDE 100 MG: 20 INJECTION, SOLUTION EPIDURAL; INFILTRATION; INTRACAUDAL; PERINEURAL at 10:59

## 2024-10-30 ASSESSMENT — PAIN DESCRIPTION - ORIENTATION
ORIENTATION: LOWER

## 2024-10-30 ASSESSMENT — PAIN DESCRIPTION - PAIN TYPE
TYPE: SURGICAL PAIN

## 2024-10-30 ASSESSMENT — PAIN SCALES - GENERAL
PAINLEVEL_OUTOF10: 2
PAINLEVEL_OUTOF10: 4
PAINLEVEL_OUTOF10: 0
PAINLEVEL_OUTOF10: 6
PAINLEVEL_OUTOF10: 3
PAINLEVEL_OUTOF10: 3
PAINLEVEL_OUTOF10: 0

## 2024-10-30 ASSESSMENT — PAIN DESCRIPTION - ONSET
ONSET: ON-GOING
ONSET: GRADUAL

## 2024-10-30 ASSESSMENT — PAIN DESCRIPTION - DESCRIPTORS
DESCRIPTORS: CRAMPING
DESCRIPTORS: CRAMPING;DISCOMFORT
DESCRIPTORS: CRAMPING

## 2024-10-30 ASSESSMENT — PAIN DESCRIPTION - LOCATION
LOCATION: VAGINA
LOCATION: ABDOMEN
LOCATION: VAGINA
LOCATION: ABDOMEN
LOCATION: ABDOMEN

## 2024-10-30 ASSESSMENT — PAIN DESCRIPTION - FREQUENCY
FREQUENCY: INTERMITTENT

## 2024-10-30 ASSESSMENT — PAIN - FUNCTIONAL ASSESSMENT: PAIN_FUNCTIONAL_ASSESSMENT: 0-10

## 2024-10-30 NOTE — INTERVAL H&P NOTE
Update History & Physical    The patient's History and Physical of October 16, 2024 was reviewed with the patient and I examined the patient. There was no change. The surgical site was confirmed by the patient and me.     Plan: The risks, benefits, expected outcome, and alternative to the recommended procedure have been discussed with the patient. Patient understands and wants to proceed with the procedure.     Electronically signed by Allison Chanel MD on 10/30/2024 at 10:51 AM

## 2024-10-30 NOTE — PERIOP NOTE
TRANSFER - IN REPORT:    [facility] for routine progression of patient care      Report consisted of patient's Situation, Background, Assessment and   Recommendations(SBAR).     Information from the following report(s) Nurse Handoff Report, Intake/Output, and MAR was reviewed with the receiving nurse.    Opportunity for questions and clarification was provided.      Assessment completed upon patient's arrival to unit and care assumed.

## 2024-10-30 NOTE — PROGRESS NOTES
TRANSFER - OUT REPORT:    Verbal report given to Bari Muro RN on [facility]  being transferred to Phase II for routine post-op       Report consisted of patient's Situation, Background, Assessment and   Recommendations(SBAR).     Information from the following report(s) Nurse Handoff Report, Adult Overview, Surgery Report, Intake/Output, MAR, and Cardiac Rhythm SR  was reviewed with the receiving nurse.           Lines:       Opportunity for questions and clarification was provided.      Patient transported with:  Registered Nurse

## 2024-10-30 NOTE — DISCHARGE INSTRUCTIONS
[facility], [address], [place] [address]  [facility], [address], [address], [place] [address]  Office: [phone]  Fax:    [phone]    PROCEDURE: Procedure(s):  HYSTEROSCOPY DILATATION AND CURETTAGE \"SPEC POP\": 86949 (CPT®)     Notify [facility] IMMEDIATELY if any of the following occur:    You are unable to urinate.  Urgency to urinate is not uncommon.  Excessive vaginal bleeding > 1 maxi pad an hour for more then 2 hours straight.  Temperature above 101.0° and / or chills.  You are nauseous and / or vomiting and you cannot hold down any fluids.  Your pain is not controlled with the pain medication prescribed.    Special Considerations:     Do not drive for at least 24 hours after the procedure and until you are no longer taking narcotic pain medication and you are able to move and react without hesitation.  You may return to work the following day.      [x] Pelvic rest (nothing in the vagina) for 2 weeks.     [] No heavy lifting over 10 pounds & no strenuous exercise for 6 weeks.      [] Other instructions:         MEDICATIONS: PROVIDED AT DISCHARGE OR PREVIOUSLY PRESCRIBED AT PRE OPERATIVE APPOINTMENT WITH [facility] --  Narcotic Pain Med.   []  Vicodin®   [x]  Percocet®   []  Dilaudid®    Non-narcotic Pain Med.  [x]   Ibuprofen        Antibiotics   []  Cipro®   []  Keflex®     []  Bactrim DS®       Urgency   []   Vesicare®       Nausea      []    Zofran®     []    Phenergan®       Other   []    Colace          If you have not already scheduled your follow-up appointment please do so with our office staff. Your virtual appointment should be in 2 weeks.    Please contact [facility] at [phone] or go to the nearest Emergency Department / Urgent Care facility for any other medical questions or concerns.              DISCHARGE SUMMARY from Nurse    PATIENT INSTRUCTIONS:    After general anesthesia or intravenous sedation, for  bleeding after surgery. Other causes include medicines such as aspirin or anticoagulants (blood thinners).  To help stop the bleeding, your doctor may have put pressure on the incision or sewn up or cauterized (sealed) the incision. Or you may have had surgery to stop bleeding inside the surgery area. Your doctor also may have given you medicines that help stop the bleeding.  How can you care for yourself at home?  If you have strips of tape on the incision, leave the tape on until it falls off. Or follow your doctor's instructions for removing the tape. Keep the area dry at all times.  You will have a dressing over the incision. A dressing helps the incision heal and protects it. Your doctor will tell you how to take care of this.  If you do not have tape on the incision, wash the area daily with warm, soapy water, and pat it dry. Don't use hydrogen peroxide or alcohol, which can slow healing.    When should you call for help?    Call your doctor now or seek immediate medical care if:    You are dizzy or lightheaded, or you feel like you may faint.     You have bleeding that starts again or gets worse, such as soaking one or more bandages over 2 to 4 hours, even after holding pressure on the area.         __________________________________________________________________________________________________________________________________

## 2024-10-30 NOTE — PERIOP NOTE
Patient refused to use the restroom, patient advised and instructed about the benefits of voiding prior to surgery.

## 2024-10-30 NOTE — ANESTHESIA PRE PROCEDURE
10/29/24                        Date of last solid food consumption: 10/29/24    BMI:   Wt Readings from Last 3 Encounters:   10/30/24 (!) 159.8 kg (352 lb 4.8 oz)   10/21/24 (!) 158.3 kg (349 lb)   03/03/21 (!) 170.7 kg (376 lb 5.2 oz)     Body mass index is 60.44 kg/m².    CBC:   Lab Results   Component Value Date/Time    WBC 7.1 02/18/2021 09:52 AM    RBC 5.05 02/18/2021 09:52 AM    HGB 11.4 02/18/2021 09:52 AM    HCT 41.0 02/18/2021 09:52 AM    MCV 81 02/18/2021 09:52 AM    RDW 16.5 02/18/2021 09:52 AM     02/18/2021 09:52 AM       CMP:   Lab Results   Component Value Date/Time     02/18/2021 09:52 AM    K 4.6 02/18/2021 09:52 AM     02/18/2021 09:52 AM    CO2 25 02/18/2021 09:52 AM    BUN 9 02/18/2021 09:52 AM    CREATININE 0.7 02/18/2021 09:52 AM    GFRAA >60.0 02/18/2021 09:52 AM    AGRATIO 1.3 02/18/2021 09:52 AM    LABGLOM >60.0 02/18/2021 09:52 AM    GLUCOSE 89 02/18/2021 09:52 AM    CALCIUM 9.0 02/18/2021 09:52 AM    BILITOT 0.3 02/18/2021 09:52 AM    ALKPHOS 164 02/18/2021 09:52 AM    AST 29 02/18/2021 09:52 AM    ALT 16 02/18/2021 09:52 AM       POC Tests: No results for input(s): \"POCGLU\", \"POCNA\", \"POCK\", \"POCCL\", \"POCBUN\", \"POCHEMO\", \"POCHCT\" in the last 72 hours.    Coags: No results found for: \"PROTIME\", \"INR\", \"APTT\"    HCG (If Applicable):   Lab Results   Component Value Date    PREGTESTUR Negative 10/30/2024    HCGQUANT 49910 (H) 09/11/2013        ABGs: No results found for: \"PHART\", \"PO2ART\", \"RRN2JZQ\", \"YHT8LAX\", \"BEART\", \"A0ODPUMV\"     Type & Screen (If Applicable):  No results found for: \"ABORH\", \"LABANTI\"    Drug/Infectious Status (If Applicable):  No results found for: \"HIV\", \"HEPCAB\"    COVID-19 Screening (If Applicable): No results found for: \"COVID19\"        Anesthesia Evaluation  Patient summary reviewed and Nursing notes reviewed   no history of anesthetic complications:   Airway: Mallampati: II     Neck ROM: full  Mouth opening: > = 3 FB   Dental: normal exam

## 2024-10-31 NOTE — OP NOTE
[address]  [address]                            OPERATIVE REPORT      PATIENT NAME: LI SEARS       : 1980  MED REC NO: [id number]                       ROOM: OR  ACCOUNT NO: [id number]                       ADMIT DATE: 10/30/2024  PROVIDER: Allison Chanel MD    DATE OF SERVICE:  10/30/2024    PREOPERATIVE DIAGNOSES:       1. Abnormal uterine bleeding.     2. BMI 60.4.     3. Chronic hypertension.    POSTOPERATIVE DIAGNOSES:       1. Abnormal uterine bleeding.     2. BMI 60.4.     3. Chronic hypertension.    PROCEDURES PERFORMED:  Hysteroscopy, dilation and curettage.    SURGEON:  Allison Chanel MD    ASSISTANT:  Jb Ahuja SA    ANESTHESIA:  General and paracervical block.    ESTIMATED BLOOD LOSS:  Minimal.    SPECIMENS REMOVED:  Endometrial curettings.    INTRAOPERATIVE FINDINGS:  Anteverted uterus sounded to 8.5 cm with normal-appearing ostia bilaterally and overall unremarkable endometrial cavity.     COMPLICATIONS:  None.    IMPLANTS:  None.    INDICATIONS:  This is a 43-year-old patient with complaints of abnormal uterine bleeding with pain.  She had a transvaginal ultrasound on 2024 demonstrating anteverted uterus 9.1 x 5.6 x 4.8 cm, endometrial thickness 6.2 mm.  Ovaries normal bilaterally.  Findings reviewed with the patient.  Risks, benefits, and alternatives discussed and she opted for hysteroscopy, D and C for further endometrial evaluation and sampling.  All questions were answered prior to surgical start time.    DESCRIPTION OF PROCEDURE:  The patient was taken to the OR where anesthesia was obtained without difficulty.  She was prepared and draped in usual sterile fashion in the dorsal lithotomy position with legs in stirrups.  A weighted speculum was placed in the vagina.  Gibbons retractor on anterior fornix to expose the cervix.  The anterior lip of the cervix was grasped with a single-tooth  tenaculum and a paracervical block was performed using 20 mL of 0.25% Marcaine plain.  The cervical os was gradually dilated to allow introduction of the hysteroscope.  This was advanced into the uterus under direct visualization with the water running.  The above findings were noted.  The hysteroscope was removed and a gentle curettage was performed until a gritty texture was noted throughout.  Specimen handed off.  Instruments were removed.  Excellent hemostasis assured at the end of case.    The patient tolerated the procedure well.  Sponge, lap, needle, and instrument counts were correct x2.  She was taken to recovery area in stable condition.    ANESTHESIOLOGIST:  Ariel Friend MD    IV FLUIDS:  400 mL of lactated Ringer's.        JORGE CHANEL MD      TXT/AQS  D:  10/30/2024 11:38:41  T:  10/31/2024 05:32:58  JOB #:  [id number]    CC:   Jorge Chanel MD

## 2024-10-31 NOTE — ANESTHESIA POSTPROCEDURE EVALUATION
Post-Anesthesia Evaluation and Assessment    Cardiovascular Function/Vital Signs  Visit Vitals  /70   Pulse 74   Temp 97.9 °F (36.6 °C) (Skin)   Resp 19   Ht 1.626 m (5' 4.02\")   Wt (!) 159.8 kg (352 lb 4.8 oz)   SpO2 100%   BMI 60.44 kg/m²       Patient is status post Procedure(s):  HYSTEROSCOPY DILATATION AND CURETTAGE \"SPEC POP\".    Nausea/Vomiting: Controlled.    Postoperative hydration reviewed and adequate.    Pain:      Managed.    Neurological Status:       At baseline.    Mental Status and Level of Consciousness: Arousable.    Pulmonary Status:       Adequate oxygenation and airway patent.    Complications related to anesthesia: None    Patient has met all discharge requirements.    Signed By: Ariel Friend MD    October 31, 2024

## 2025-07-04 ENCOUNTER — HOSPITAL ENCOUNTER (EMERGENCY)
Facility: HOSPITAL | Age: 45
Discharge: HOME | End: 2025-07-04
Attending: EMERGENCY MEDICINE
Payer: MEDICAID

## 2025-07-04 VITALS
TEMPERATURE: 98.2 F | RESPIRATION RATE: 18 BRPM | WEIGHT: 293 LBS | BODY MASS INDEX: 50.02 KG/M2 | SYSTOLIC BLOOD PRESSURE: 115 MMHG | HEIGHT: 64 IN | OXYGEN SATURATION: 100 % | DIASTOLIC BLOOD PRESSURE: 79 MMHG | HEART RATE: 78 BPM

## 2025-07-04 DIAGNOSIS — R11.2 NAUSEA AND VOMITING, UNSPECIFIED VOMITING TYPE: Primary | ICD-10-CM

## 2025-07-04 DIAGNOSIS — R10.30 LOWER ABDOMINAL PAIN: ICD-10-CM

## 2025-07-04 LAB
ALBUMIN SERPL-MCNC: 3.4 G/DL (ref 3.4–5)
ALBUMIN/GLOB SERPL: 0.9 (ref 0.8–1.7)
ALP SERPL-CCNC: 133 U/L (ref 45–117)
ALT SERPL-CCNC: 15 U/L (ref 10–35)
ANION GAP SERPL CALC-SCNC: 10 MMOL/L (ref 3–18)
APPEARANCE UR: CLEAR
AST SERPL-CCNC: 29 U/L (ref 10–38)
BASOPHILS # BLD: 0.06 K/UL (ref 0–0.1)
BASOPHILS NFR BLD: 0.9 % (ref 0–2)
BILIRUB SERPL-MCNC: 0.3 MG/DL (ref 0.2–1)
BILIRUB UR QL: NEGATIVE
BUN SERPL-MCNC: 7 MG/DL (ref 6–23)
BUN/CREAT SERPL: 9 (ref 12–20)
CALCIUM SERPL-MCNC: 9.3 MG/DL (ref 8.5–10.1)
CHLORIDE SERPL-SCNC: 103 MMOL/L (ref 98–107)
CO2 SERPL-SCNC: 25 MMOL/L (ref 21–32)
COLOR UR: YELLOW
CREAT SERPL-MCNC: 0.78 MG/DL (ref 0.6–1.3)
DIFFERENTIAL METHOD BLD: ABNORMAL
EOSINOPHIL # BLD: 0.08 K/UL (ref 0–0.4)
EOSINOPHIL NFR BLD: 1.2 % (ref 0–5)
ERYTHROCYTE [DISTWIDTH] IN BLOOD BY AUTOMATED COUNT: 15.1 % (ref 11.6–14.5)
GLOBULIN SER CALC-MCNC: 3.8 G/DL (ref 2–4)
GLUCOSE SERPL-MCNC: 113 MG/DL (ref 74–108)
GLUCOSE UR STRIP.AUTO-MCNC: NEGATIVE MG/DL
HCG UR QL: NEGATIVE
HCT VFR BLD AUTO: 37.7 % (ref 35–45)
HGB BLD-MCNC: 11.6 G/DL (ref 12–16)
HGB UR QL STRIP: NEGATIVE
IMM GRANULOCYTES # BLD AUTO: 0.02 K/UL (ref 0–0.04)
IMM GRANULOCYTES NFR BLD AUTO: 0.3 % (ref 0–0.5)
KETONES UR QL STRIP.AUTO: NEGATIVE MG/DL
LEUKOCYTE ESTERASE UR QL STRIP.AUTO: NEGATIVE
LIPASE SERPL-CCNC: 17 U/L (ref 13–75)
LYMPHOCYTES # BLD: 2.22 K/UL (ref 0.9–3.3)
LYMPHOCYTES NFR BLD: 33.2 % (ref 21–52)
MCH RBC QN AUTO: 22.9 PG (ref 24–34)
MCHC RBC AUTO-ENTMCNC: 30.8 G/DL (ref 31–37)
MCV RBC AUTO: 74.4 FL (ref 78–100)
MONOCYTES # BLD: 0.39 K/UL (ref 0.05–1.2)
MONOCYTES NFR BLD: 5.8 % (ref 3–10)
NEUTS SEG # BLD: 3.92 K/UL (ref 1.8–8)
NEUTS SEG NFR BLD: 58.6 % (ref 40–73)
NITRITE UR QL STRIP.AUTO: NEGATIVE
NRBC # BLD: 0 K/UL (ref 0–0.01)
NRBC BLD-RTO: 0 PER 100 WBC
PH UR STRIP: 5.5 (ref 5–8)
PLATELET # BLD AUTO: 235 K/UL (ref 135–420)
PMV BLD AUTO: 11 FL (ref 9.2–11.8)
POTASSIUM SERPL-SCNC: 4.4 MMOL/L (ref 3.5–5.5)
PROT SERPL-MCNC: 7.2 G/DL (ref 6.4–8.2)
PROT UR STRIP-MCNC: NEGATIVE MG/DL
RBC # BLD AUTO: 5.07 M/UL (ref 4.2–5.3)
SODIUM SERPL-SCNC: 138 MMOL/L (ref 136–145)
SP GR UR REFRACTOMETRY: 1.01 (ref 1–1.03)
UROBILINOGEN UR QL STRIP.AUTO: 1 EU/DL (ref 0.2–1)
WBC # BLD AUTO: 6.7 K/UL (ref 4.6–13.2)

## 2025-07-04 PROCEDURE — 83690 ASSAY OF LIPASE: CPT

## 2025-07-04 PROCEDURE — 6360000002 HC RX W HCPCS: Performed by: EMERGENCY MEDICINE

## 2025-07-04 PROCEDURE — 96374 THER/PROPH/DIAG INJ IV PUSH: CPT

## 2025-07-04 PROCEDURE — 80053 COMPREHEN METABOLIC PANEL: CPT

## 2025-07-04 PROCEDURE — 81003 URINALYSIS AUTO W/O SCOPE: CPT

## 2025-07-04 PROCEDURE — 99284 EMERGENCY DEPT VISIT MOD MDM: CPT

## 2025-07-04 PROCEDURE — 85025 COMPLETE CBC W/AUTO DIFF WBC: CPT

## 2025-07-04 PROCEDURE — 2580000003 HC RX 258: Performed by: EMERGENCY MEDICINE

## 2025-07-04 PROCEDURE — 81025 URINE PREGNANCY TEST: CPT

## 2025-07-04 RX ORDER — ONDANSETRON 2 MG/ML
4 INJECTION INTRAMUSCULAR; INTRAVENOUS
Status: COMPLETED | OUTPATIENT
Start: 2025-07-04 | End: 2025-07-04

## 2025-07-04 RX ORDER — 0.9 % SODIUM CHLORIDE 0.9 %
1000 INTRAVENOUS SOLUTION INTRAVENOUS ONCE
Status: COMPLETED | OUTPATIENT
Start: 2025-07-04 | End: 2025-07-04

## 2025-07-04 RX ORDER — FAMOTIDINE 20 MG/1
20 TABLET, FILM COATED ORAL 2 TIMES DAILY
Qty: 60 TABLET | Refills: 0 | Status: SHIPPED | OUTPATIENT
Start: 2025-07-04

## 2025-07-04 RX ORDER — ONDANSETRON 4 MG/1
4 TABLET, ORALLY DISINTEGRATING ORAL EVERY 8 HOURS PRN
Qty: 20 TABLET | Refills: 0 | Status: SHIPPED | OUTPATIENT
Start: 2025-07-04

## 2025-07-04 RX ORDER — DICYCLOMINE HCL 20 MG
20 TABLET ORAL EVERY 6 HOURS
Qty: 24 TABLET | Refills: 0 | Status: SHIPPED | OUTPATIENT
Start: 2025-07-04

## 2025-07-04 RX ADMIN — SODIUM CHLORIDE 1000 ML: 0.9 INJECTION, SOLUTION INTRAVENOUS at 09:54

## 2025-07-04 RX ADMIN — ONDANSETRON 4 MG: 2 INJECTION, SOLUTION INTRAMUSCULAR; INTRAVENOUS at 09:54

## 2025-07-04 ASSESSMENT — PAIN - FUNCTIONAL ASSESSMENT: PAIN_FUNCTIONAL_ASSESSMENT: 0-10

## 2025-07-04 ASSESSMENT — PAIN DESCRIPTION - DESCRIPTORS: DESCRIPTORS: SHARP

## 2025-07-04 ASSESSMENT — PAIN DESCRIPTION - LOCATION: LOCATION: ABDOMEN

## 2025-07-04 ASSESSMENT — PAIN DESCRIPTION - ORIENTATION: ORIENTATION: LOWER

## 2025-07-04 ASSESSMENT — PAIN SCALES - GENERAL: PAINLEVEL_OUTOF10: 8

## 2025-07-04 NOTE — ED PROVIDER NOTES
[facility] EMERGENCY DEPT  EMERGENCY DEPARTMENT ENCOUNTER    Patient Name: Tram Small  MRN: [id number]  YOB: 1980  Provider: Monroe Pablo MD  PCP: Yolis Manrique DO   Time/Date of evaluation: 8:57 AM EDT on 7/4/25    History of Presenting Illness     Chief Complaint   Patient presents with    Nausea    Abdominal Pain    Vomiting       History Provided by: Patient   History is limited by: Nothing    HISTORY (Narative):   Tram Small is a 44 y.o. female with a PMHX of chronic knee pain, diverticulosis, multiple sclerosis, sarcoidosis, cholecystectomy, tubal ligation who presents to the emergency department (room 15) by POV C/O lower abdominal pain onset 7:00 AM today. Associated sxs include nausea, vomiting. Patient denies any other sxs or complaints.     Nursing Notes were all reviewed and agreed with or any disagreements were addressed in the HPI.    Past History     PAST MEDICAL HISTORY:  Past Medical History:   Diagnosis Date    Acid reflux 2014    pt states this is severe on meds    Anemia 2014    on meds    Anxiety 2024    on meds  managed by therapist    Arthritis 2020    knees    Chronic pain 2019    knees managed by steriod injections  every 6 months    Diverticulosis     mild / seen on March 2020 CT scan     Edema     GERD (gastroesophageal reflux disease) 2014    managed by gastro    Hx of acute cystitis 09/22/2018    eithout hematuria    Hx of colonic polyps 2024    Morbid obesity (HCC)     BMI  59.91    Morbid obesity with BMI of 60.0-69.9, adult (HCC)     MS (multiple sclerosis) (HCC) 12/13/2022    noted on CE    Polyarthralgia 12/17/2019    Sarcoidosis 08/30/2022    CE       PAST SURGICAL HISTORY:  Past Surgical History:   Procedure Laterality Date    CHOLECYSTECTOMY  2007    COLONOSCOPY  2024    x3 2011, 2020    DILATION AND CURETTAGE OF UTERUS N/A 10/30/2024    HYSTEROSCOPY DILATATION AND CURETTAGE \"SPEC POP\" performed by Allison Chanle MD at [facility] OR

## 2025-07-04 NOTE — DISCHARGE INSTRUCTIONS
BUN 7 6 - 23 MG/DL    Creatinine 0.78 0.60 - 1.30 MG/DL    BUN/Creatinine Ratio 9 (L) 12 - 20      Est, Glom Filt Rate >90 >60 ml/min/1.73m2    Calcium 9.3 8.5 - 10.1 MG/DL    Total Bilirubin 0.3 0.2 - 1.0 MG/DL    ALT 15 10 - 35 U/L    AST 29 10 - 38 U/L    Alk Phosphatase 133 (H) 45 - 117 U/L    Total Protein 7.2 6.4 - 8.2 g/dL    Albumin 3.4 3.4 - 5.0 g/dL    Globulin 3.8 2.0 - 4.0 g/dL    Albumin/Globulin Ratio 0.9 0.8 - 1.7     Lipase    Collection Time: 07/04/25  9:04 AM   Result Value Ref Range    Lipase 17 13 - 75 U/L   Pregnancy, Urine    Collection Time: 07/04/25  9:18 AM   Result Value Ref Range    Pregnancy, Urine Negative NEG     Urinalysis    Collection Time: 07/04/25  9:18 AM   Result Value Ref Range    Color, UA YELLOW      Appearance CLEAR      Specific Gravity, UA 1.014 1.005 - 1.030      pH, Urine 5.5 5.0 - 8.0      Protein, UA Negative NEG mg/dL    Glucose, Ur Negative NEG mg/dL    Ketones, Urine Negative NEG mg/dL    Bilirubin, Urine Negative NEG      Blood, Urine Negative NEG      Urobilinogen, Urine 1.0 0.2 - 1.0 EU/dL    Nitrite, Urine Negative NEG      Leukocyte Esterase, Urine Negative NEG          Radiologic Studies  No orders to display     -----------------------------------------------------------------------------  The evaluation and treatment you received in the Emergency Department were for an urgent problem. It is important that you follow-up with a doctor, nurse practitioner, or physician assistant to: 1. confirm your diagnosis, 2. re-evaluate changes in your illness and treatment, and 3. for ongoing care. In some cases, specialist follow up is recommended. You will need to call to arrange an appointment. Recommended providers are listed in this packet. Please take your discharge instructions with you when you go to your follow-up appointment.      If your symptoms become worse or you do not improve as expected, please return to us. We are available 24 hours a day.      If a

## 2025-07-04 NOTE — ED TRIAGE NOTES
Patient arrives ambulatory to triage c/o lower abdominal pain, nausea, and vomiting. Patient states pain started around 0700 this morning. Patient states it feels like cramping in the lower abdomen. Patient rates pain 8/10.

## (undated) DEVICE — SOLUTION IV LACTATED RINGERS INJECTION USP

## (undated) DEVICE — GLOVE SURG SZ 65 THK91MIL LTX FREE SYN POLYISOPRENE

## (undated) DEVICE — D&C HYSTEROSCOPY: Brand: MEDLINE INDUSTRIES, INC.

## (undated) DEVICE — GARMENT,MEDLINE,DVT,INT,CALF,MED, GEN2: Brand: MEDLINE